# Patient Record
Sex: FEMALE | Race: BLACK OR AFRICAN AMERICAN | NOT HISPANIC OR LATINO | URBAN - METROPOLITAN AREA
[De-identification: names, ages, dates, MRNs, and addresses within clinical notes are randomized per-mention and may not be internally consistent; named-entity substitution may affect disease eponyms.]

---

## 2017-08-25 ENCOUNTER — INPATIENT (INPATIENT)
Facility: HOSPITAL | Age: 62
LOS: 2 days | Discharge: HOME | End: 2017-08-28
Attending: INTERNAL MEDICINE | Admitting: INTERNAL MEDICINE
Payer: COMMERCIAL

## 2017-08-25 VITALS
SYSTOLIC BLOOD PRESSURE: 106 MMHG | HEART RATE: 109 BPM | RESPIRATION RATE: 18 BRPM | DIASTOLIC BLOOD PRESSURE: 52 MMHG | OXYGEN SATURATION: 99 % | TEMPERATURE: 98 F

## 2017-08-25 DIAGNOSIS — R07.9 CHEST PAIN, UNSPECIFIED: ICD-10-CM

## 2017-08-25 LAB
ALBUMIN SERPL ELPH-MCNC: 3.8 G/DL — SIGNIFICANT CHANGE UP (ref 3.3–5)
ALP SERPL-CCNC: 87 U/L — SIGNIFICANT CHANGE UP (ref 40–120)
ALT FLD-CCNC: 16 U/L — SIGNIFICANT CHANGE UP (ref 4–33)
APTT BLD: 31.9 SEC — SIGNIFICANT CHANGE UP (ref 27.5–37.4)
AST SERPL-CCNC: 13 U/L — SIGNIFICANT CHANGE UP (ref 4–32)
BASOPHILS # BLD AUTO: 0.04 K/UL — SIGNIFICANT CHANGE UP (ref 0–0.2)
BASOPHILS NFR BLD AUTO: 0.6 % — SIGNIFICANT CHANGE UP (ref 0–2)
BILIRUB SERPL-MCNC: 0.4 MG/DL — SIGNIFICANT CHANGE UP (ref 0.2–1.2)
BUN SERPL-MCNC: 17 MG/DL — SIGNIFICANT CHANGE UP (ref 7–23)
CALCIUM SERPL-MCNC: 9.2 MG/DL — SIGNIFICANT CHANGE UP (ref 8.4–10.5)
CHLORIDE SERPL-SCNC: 100 MMOL/L — SIGNIFICANT CHANGE UP (ref 98–107)
CK MB BLD-MCNC: 1.89 NG/ML — SIGNIFICANT CHANGE UP (ref 1–4.7)
CK MB BLD-MCNC: SIGNIFICANT CHANGE UP (ref 0–2.5)
CK SERPL-CCNC: 75 U/L — SIGNIFICANT CHANGE UP (ref 25–170)
CO2 SERPL-SCNC: 25 MMOL/L — SIGNIFICANT CHANGE UP (ref 22–31)
CREAT SERPL-MCNC: 1.04 MG/DL — SIGNIFICANT CHANGE UP (ref 0.5–1.3)
D DIMER BLD IA.RAPID-MCNC: < 150 NG/ML — SIGNIFICANT CHANGE UP
EOSINOPHIL # BLD AUTO: 0.19 K/UL — SIGNIFICANT CHANGE UP (ref 0–0.5)
EOSINOPHIL NFR BLD AUTO: 2.7 % — SIGNIFICANT CHANGE UP (ref 0–6)
GLUCOSE SERPL-MCNC: 409 MG/DL — HIGH (ref 70–99)
HCT VFR BLD CALC: 40.4 % — SIGNIFICANT CHANGE UP (ref 34.5–45)
HGB BLD-MCNC: 13.2 G/DL — SIGNIFICANT CHANGE UP (ref 11.5–15.5)
IMM GRANULOCYTES # BLD AUTO: 0.06 # — SIGNIFICANT CHANGE UP
IMM GRANULOCYTES NFR BLD AUTO: 0.8 % — SIGNIFICANT CHANGE UP (ref 0–1.5)
INR BLD: 0.88 — SIGNIFICANT CHANGE UP (ref 0.88–1.17)
LYMPHOCYTES # BLD AUTO: 2.93 K/UL — SIGNIFICANT CHANGE UP (ref 1–3.3)
LYMPHOCYTES # BLD AUTO: 41 % — SIGNIFICANT CHANGE UP (ref 13–44)
MCHC RBC-ENTMCNC: 26.1 PG — LOW (ref 27–34)
MCHC RBC-ENTMCNC: 32.7 % — SIGNIFICANT CHANGE UP (ref 32–36)
MCV RBC AUTO: 79.8 FL — LOW (ref 80–100)
MONOCYTES # BLD AUTO: 0.66 K/UL — SIGNIFICANT CHANGE UP (ref 0–0.9)
MONOCYTES NFR BLD AUTO: 9.2 % — SIGNIFICANT CHANGE UP (ref 2–14)
NEUTROPHILS # BLD AUTO: 3.26 K/UL — SIGNIFICANT CHANGE UP (ref 1.8–7.4)
NEUTROPHILS NFR BLD AUTO: 45.7 % — SIGNIFICANT CHANGE UP (ref 43–77)
NRBC # FLD: 0 — SIGNIFICANT CHANGE UP
NT-PROBNP SERPL-SCNC: 18.65 PG/ML — SIGNIFICANT CHANGE UP
PLATELET # BLD AUTO: 251 K/UL — SIGNIFICANT CHANGE UP (ref 150–400)
PMV BLD: 11.1 FL — SIGNIFICANT CHANGE UP (ref 7–13)
POTASSIUM SERPL-MCNC: 4 MMOL/L — SIGNIFICANT CHANGE UP (ref 3.5–5.3)
POTASSIUM SERPL-SCNC: 4 MMOL/L — SIGNIFICANT CHANGE UP (ref 3.5–5.3)
PROT SERPL-MCNC: 6.6 G/DL — SIGNIFICANT CHANGE UP (ref 6–8.3)
PROTHROM AB SERPL-ACNC: 9.8 SEC — SIGNIFICANT CHANGE UP (ref 9.8–13.1)
RBC # BLD: 5.06 M/UL — SIGNIFICANT CHANGE UP (ref 3.8–5.2)
RBC # FLD: 13.3 % — SIGNIFICANT CHANGE UP (ref 10.3–14.5)
SODIUM SERPL-SCNC: 138 MMOL/L — SIGNIFICANT CHANGE UP (ref 135–145)
TROPONIN T SERPL-MCNC: < 0.06 NG/ML — SIGNIFICANT CHANGE UP (ref 0–0.06)
WBC # BLD: 7.14 K/UL — SIGNIFICANT CHANGE UP (ref 3.8–10.5)
WBC # FLD AUTO: 7.14 K/UL — SIGNIFICANT CHANGE UP (ref 3.8–10.5)

## 2017-08-25 PROCEDURE — 71020: CPT | Mod: 26

## 2017-08-25 RX ORDER — ASPIRIN/CALCIUM CARB/MAGNESIUM 324 MG
162 TABLET ORAL DAILY
Qty: 0 | Refills: 0 | Status: DISCONTINUED | OUTPATIENT
Start: 2017-08-25 | End: 2017-08-26

## 2017-08-25 RX ORDER — SODIUM CHLORIDE 9 MG/ML
1000 INJECTION INTRAMUSCULAR; INTRAVENOUS; SUBCUTANEOUS ONCE
Qty: 0 | Refills: 0 | Status: COMPLETED | OUTPATIENT
Start: 2017-08-25 | End: 2017-08-25

## 2017-08-25 RX ORDER — INSULIN LISPRO 100/ML
6 VIAL (ML) SUBCUTANEOUS ONCE
Qty: 0 | Refills: 0 | Status: COMPLETED | OUTPATIENT
Start: 2017-08-25 | End: 2017-08-25

## 2017-08-25 RX ADMIN — SODIUM CHLORIDE 1000 MILLILITER(S): 9 INJECTION INTRAMUSCULAR; INTRAVENOUS; SUBCUTANEOUS at 19:27

## 2017-08-25 RX ADMIN — Medication 6 UNIT(S): at 19:54

## 2017-08-25 RX ADMIN — Medication 162 MILLIGRAM(S): at 19:27

## 2017-08-25 NOTE — ED ADULT NURSE NOTE - CHIEF COMPLAINT QUOTE
Patient was seen by her MD, and cardiologist for abnormal EKG (PVCs) . Pt is c/o nausea, chest discomfort, palpitations and shortness of breath.

## 2017-08-25 NOTE — ED PROVIDER NOTE - OBJECTIVE STATEMENT
62 yr old female with hx of HTN, DM non-compliant with insulin presents to ed c/o irregular fast and pounding heart beat x1 month worsening past 1 wk.  per pt, states she feels chest stabbing pain at left chest with irregular, fast and pounding beats coming and going at random.  pt also c/o nausea without vomiting and SOB at rest.  pt went to a pcp and then went to a cardiologist at [place] where she works and had stress test done 8/25/17 which showed no ichemic changes or sustained arrythmia.  pt started on metoprolol 25mg which she has been compliant on.  A1c 11.7 2 days ago.  non-smoker but obese

## 2017-08-25 NOTE — ED ADULT NURSE NOTE - ED STAT RN HANDOFF DETAILS
endorsed to natalia jones. pt a*o x3 nad noted. vss. pt offers no complaints at this time. safety maintained

## 2017-08-25 NOTE — ED ADULT NURSE NOTE - OBJECTIVE STATEMENT
Pt to spot # 2 alert and oriented X 3 with family at bedside, H/O DM non-compliant with insulin presents to ed c/o irregular fast and pounding heart beat x1 month worsening past 1 wk.  per pt, states she feels chest stabbing pain at left chest with irregular, fast and pounding beats coming and going at random.  pt also c/o nausea without vomiting and SOB at rest. states she feels like a pulse in her stomach   pt went to a pcp and then went to a cardiologist at [place] where she works and had stress test done 8/25/17 which showed no ischemic changes or sustained arrythmia.  pt started on metoprolol 25mg which she has been compliant on.  Md cortés at bedside, labs drawn and sent vitals as noted, will monitor and follow up

## 2017-08-25 NOTE — ED PROVIDER NOTE - MEDICAL DECISION MAKING DETAILS
62 yr old female with hx of HTN, DM non-compliant with insulin presents to ed c/o irregular fast and pounding heart beat x1 month worsening past 1 wk.  per pt, states she feels chest stabbing pain at left chest with irregular, fast and pounding beats coming and going at random.  pt also c/o nausea without vomiting and SOB at rest.  pt went to a pcp and then went to a cardiologist at [place] where she works and had stress test done 8/25/17 which showed no ichemic changes or sustained arrythmia.  pt started on metoprolol 25mg which she has been compliant on.  A1c 11.7 2 days ago.  non-smoker but obese    palpitations with chest pain r/o acs vs arrhythmia vs PE. labs, cxr, ekg, fs, insulin, fluids, admit

## 2017-08-25 NOTE — ED ADULT TRIAGE NOTE - CHIEF COMPLAINT QUOTE
Patient was seen by her MD, and cardiologist for abnormal EKG (PVCs) . Pt is c/o nausea, chest discomfort, palpitations Patient was seen by her MD, and cardiologist for abnormal EKG (PVCs) . Pt is c/o nausea, chest discomfort, palpitations and shortness of breath.

## 2017-08-25 NOTE — ED PROVIDER NOTE - PROGRESS NOTE DETAILS
[place]: hemodynamically stable.  CXR no acute abnormality.  trop neg and d dimer neg.  Pt ekg no pvc or ischemic changes.  Pt at 9p c/o feel chest pain again.  repeat ekg shows no ischemic changes.  asa given.  Admit to medicine tele for chest pain r/o acs.  took her metoprolol 25mg in ed.

## 2017-08-26 DIAGNOSIS — I10 ESSENTIAL (PRIMARY) HYPERTENSION: ICD-10-CM

## 2017-08-26 DIAGNOSIS — Z90.710 ACQUIRED ABSENCE OF BOTH CERVIX AND UTERUS: Chronic | ICD-10-CM

## 2017-08-26 DIAGNOSIS — E78.5 HYPERLIPIDEMIA, UNSPECIFIED: ICD-10-CM

## 2017-08-26 DIAGNOSIS — E11.65 TYPE 2 DIABETES MELLITUS WITH HYPERGLYCEMIA: ICD-10-CM

## 2017-08-26 DIAGNOSIS — E66.9 OBESITY, UNSPECIFIED: ICD-10-CM

## 2017-08-26 DIAGNOSIS — E11.9 TYPE 2 DIABETES MELLITUS WITHOUT COMPLICATIONS: ICD-10-CM

## 2017-08-26 DIAGNOSIS — R07.9 CHEST PAIN, UNSPECIFIED: ICD-10-CM

## 2017-08-26 DIAGNOSIS — Z29.9 ENCOUNTER FOR PROPHYLACTIC MEASURES, UNSPECIFIED: ICD-10-CM

## 2017-08-26 LAB
CHOLEST SERPL-MCNC: 184 MG/DL — SIGNIFICANT CHANGE UP (ref 120–199)
CK MB BLD-MCNC: 1.67 NG/ML — SIGNIFICANT CHANGE UP (ref 1–4.7)
CK SERPL-CCNC: 66 U/L — SIGNIFICANT CHANGE UP (ref 25–170)
HBA1C BLD-MCNC: 12.5 % — HIGH (ref 4–5.6)
HCT VFR BLD CALC: 40.9 % — SIGNIFICANT CHANGE UP (ref 34.5–45)
HDLC SERPL-MCNC: 46 MG/DL — SIGNIFICANT CHANGE UP (ref 45–65)
HGB BLD-MCNC: 13.1 G/DL — SIGNIFICANT CHANGE UP (ref 11.5–15.5)
LIPID PNL WITH DIRECT LDL SERPL: 122 MG/DL — SIGNIFICANT CHANGE UP
MCHC RBC-ENTMCNC: 26 PG — LOW (ref 27–34)
MCHC RBC-ENTMCNC: 32 % — SIGNIFICANT CHANGE UP (ref 32–36)
MCV RBC AUTO: 81.2 FL — SIGNIFICANT CHANGE UP (ref 80–100)
NRBC # FLD: 0 — SIGNIFICANT CHANGE UP
PLATELET # BLD AUTO: 228 K/UL — SIGNIFICANT CHANGE UP (ref 150–400)
PMV BLD: 10.9 FL — SIGNIFICANT CHANGE UP (ref 7–13)
RBC # BLD: 5.04 M/UL — SIGNIFICANT CHANGE UP (ref 3.8–5.2)
RBC # FLD: 13.3 % — SIGNIFICANT CHANGE UP (ref 10.3–14.5)
TRIGL SERPL-MCNC: 123 MG/DL — SIGNIFICANT CHANGE UP (ref 10–149)
TROPONIN T SERPL-MCNC: < 0.06 NG/ML — SIGNIFICANT CHANGE UP (ref 0–0.06)
TSH SERPL-MCNC: 1.51 UIU/ML — SIGNIFICANT CHANGE UP (ref 0.27–4.2)
WBC # BLD: 5.39 K/UL — SIGNIFICANT CHANGE UP (ref 3.8–10.5)
WBC # FLD AUTO: 5.39 K/UL — SIGNIFICANT CHANGE UP (ref 3.8–10.5)

## 2017-08-26 PROCEDURE — 99254 IP/OBS CNSLTJ NEW/EST MOD 60: CPT

## 2017-08-26 RX ORDER — LOSARTAN POTASSIUM 100 MG/1
100 TABLET, FILM COATED ORAL DAILY
Qty: 0 | Refills: 0 | Status: DISCONTINUED | OUTPATIENT
Start: 2017-08-26 | End: 2017-08-28

## 2017-08-26 RX ORDER — METOPROLOL TARTRATE 50 MG
25 TABLET ORAL
Qty: 0 | Refills: 0 | Status: DISCONTINUED | OUTPATIENT
Start: 2017-08-26 | End: 2017-08-28

## 2017-08-26 RX ORDER — INSULIN LISPRO 100/ML
VIAL (ML) SUBCUTANEOUS AT BEDTIME
Qty: 0 | Refills: 0 | Status: DISCONTINUED | OUTPATIENT
Start: 2017-08-26 | End: 2017-08-28

## 2017-08-26 RX ORDER — GLUCAGON INJECTION, SOLUTION 0.5 MG/.1ML
1 INJECTION, SOLUTION SUBCUTANEOUS ONCE
Qty: 0 | Refills: 0 | Status: DISCONTINUED | OUTPATIENT
Start: 2017-08-26 | End: 2017-08-28

## 2017-08-26 RX ORDER — HEPARIN SODIUM 5000 [USP'U]/ML
5000 INJECTION INTRAVENOUS; SUBCUTANEOUS EVERY 12 HOURS
Qty: 0 | Refills: 0 | Status: DISCONTINUED | OUTPATIENT
Start: 2017-08-26 | End: 2017-08-28

## 2017-08-26 RX ORDER — INSULIN GLARGINE 100 [IU]/ML
30 INJECTION, SOLUTION SUBCUTANEOUS AT BEDTIME
Qty: 0 | Refills: 0 | Status: DISCONTINUED | OUTPATIENT
Start: 2017-08-26 | End: 2017-08-28

## 2017-08-26 RX ORDER — HYDROCHLOROTHIAZIDE 25 MG
12.5 TABLET ORAL DAILY
Qty: 0 | Refills: 0 | Status: DISCONTINUED | OUTPATIENT
Start: 2017-08-26 | End: 2017-08-28

## 2017-08-26 RX ORDER — ASPIRIN/CALCIUM CARB/MAGNESIUM 324 MG
81 TABLET ORAL DAILY
Qty: 0 | Refills: 0 | Status: DISCONTINUED | OUTPATIENT
Start: 2017-08-26 | End: 2017-08-27

## 2017-08-26 RX ORDER — DEXTROSE 50 % IN WATER 50 %
1 SYRINGE (ML) INTRAVENOUS ONCE
Qty: 0 | Refills: 0 | Status: DISCONTINUED | OUTPATIENT
Start: 2017-08-26 | End: 2017-08-28

## 2017-08-26 RX ORDER — DEXTROSE 50 % IN WATER 50 %
12.5 SYRINGE (ML) INTRAVENOUS ONCE
Qty: 0 | Refills: 0 | Status: DISCONTINUED | OUTPATIENT
Start: 2017-08-26 | End: 2017-08-28

## 2017-08-26 RX ORDER — INSULIN GLARGINE 100 [IU]/ML
24 INJECTION, SOLUTION SUBCUTANEOUS AT BEDTIME
Qty: 0 | Refills: 0 | Status: DISCONTINUED | OUTPATIENT
Start: 2017-08-26 | End: 2017-08-26

## 2017-08-26 RX ORDER — DEXTROSE 50 % IN WATER 50 %
25 SYRINGE (ML) INTRAVENOUS ONCE
Qty: 0 | Refills: 0 | Status: DISCONTINUED | OUTPATIENT
Start: 2017-08-26 | End: 2017-08-28

## 2017-08-26 RX ORDER — INSULIN LISPRO 100/ML
10 VIAL (ML) SUBCUTANEOUS
Qty: 0 | Refills: 0 | Status: DISCONTINUED | OUTPATIENT
Start: 2017-08-26 | End: 2017-08-28

## 2017-08-26 RX ORDER — INSULIN LISPRO 100/ML
VIAL (ML) SUBCUTANEOUS
Qty: 0 | Refills: 0 | Status: DISCONTINUED | OUTPATIENT
Start: 2017-08-26 | End: 2017-08-28

## 2017-08-26 RX ORDER — INSULIN LISPRO 100/ML
8 VIAL (ML) SUBCUTANEOUS
Qty: 0 | Refills: 0 | Status: DISCONTINUED | OUTPATIENT
Start: 2017-08-26 | End: 2017-08-26

## 2017-08-26 RX ORDER — SODIUM CHLORIDE 9 MG/ML
1000 INJECTION, SOLUTION INTRAVENOUS
Qty: 0 | Refills: 0 | Status: DISCONTINUED | OUTPATIENT
Start: 2017-08-26 | End: 2017-08-28

## 2017-08-26 RX ADMIN — Medication 4: at 12:46

## 2017-08-26 RX ADMIN — Medication 8: at 08:53

## 2017-08-26 RX ADMIN — Medication 25 MILLIGRAM(S): at 06:31

## 2017-08-26 RX ADMIN — LOSARTAN POTASSIUM 100 MILLIGRAM(S): 100 TABLET, FILM COATED ORAL at 06:54

## 2017-08-26 RX ADMIN — INSULIN GLARGINE 30 UNIT(S): 100 INJECTION, SOLUTION SUBCUTANEOUS at 21:58

## 2017-08-26 RX ADMIN — Medication 81 MILLIGRAM(S): at 12:47

## 2017-08-26 RX ADMIN — Medication 10 UNIT(S): at 17:29

## 2017-08-26 RX ADMIN — Medication 25 MILLIGRAM(S): at 17:39

## 2017-08-26 RX ADMIN — HEPARIN SODIUM 5000 UNIT(S): 5000 INJECTION INTRAVENOUS; SUBCUTANEOUS at 06:32

## 2017-08-26 RX ADMIN — Medication 2: at 17:28

## 2017-08-26 RX ADMIN — Medication 12.5 MILLIGRAM(S): at 06:31

## 2017-08-26 RX ADMIN — HEPARIN SODIUM 5000 UNIT(S): 5000 INJECTION INTRAVENOUS; SUBCUTANEOUS at 17:39

## 2017-08-26 NOTE — H&P ADULT - ATTENDING COMMENTS
62 year old woman with HTN and uncontrolled IDDM II presents with chest pain    #Chest pain- patient with plain exercise stress test in outside cardiologist's office yesterday.  Results reviewed- no ischemic changes on EKG, but patient noted to have ventricular bigeminy.  No ischemic changes on admission EKG.  Given high pre-test probability for CAD given uncontrolled DM and HTN, will further assess for ischemia with nuclear stress test.  Continue ASA and beta-blocker    #Uncontrolled IDDM II- basal/bolus insulin. Endocrine consult.    #HTN- BP at goal on current regimen    #Obesity- patient counselled on diet and exercise.    Plan of care discussed at length with patient. 62 year old woman with HTN and uncontrolled IDDM II presents with chest pain    #Chest pain- patient with plain exercise stress test in outside cardiologist's office yesterday.  Results reviewed- no ischemic changes on EKG, but patient noted to have ventricular bigeminy.  No ischemic changes on admission EKG.  Given high pre-test probability for CAD given uncontrolled DM and HTN, will further assess for ischemia with nuclear stress test.  Continue ASA and beta-blocker  Check TTE    #Uncontrolled IDDM II- basal/bolus insulin. Endocrine consult.    #HTN- BP at goal on current regimen    #Obesity- patient counselled on diet and exercise.    Plan of care discussed at length with patient. 62 year old woman with HTN and uncontrolled IDDM II presents with chest pain    #Chest pain- patient with plain exercise stress test in outside cardiologist's office yesterday.  Results reviewed- no ischemic changes on EKG, but patient noted to have ventricular bigeminy.  No ischemic changes on admission EKG.  Given high pre-test probability for CAD given uncontrolled DM and HTN, will further assess for ischemia with nuclear stress test.  Continue ASA and beta-blocker  Check TTE    #Hyperglycemic non-ketotic state- Uncontrolled IDDM II- basal/bolus insulin. No anion gap.  Endocrine consult.    #HTN- BP at goal on current regimen    #Obesity- patient counselled on diet and exercise.    Plan of care discussed at length with patient. 62 year old woman with HTN and uncontrolled IDDM II presents with chest pain    #Chest pain- patient with plain exercise stress test in outside cardiologist's office yesterday.  Results reviewed- no ischemic changes on EKG, but patient noted to have ventricular bigeminy.  No ischemic changes on admission EKG.  Given high pre-test probability for CAD given uncontrolled DM and HTN, will further assess for ischemia with nuclear stress test.  Continue ASA and beta-blocker  Check TTE    #Hyperglycemic non-ketotic state- Uncontrolled IDDM II- basal/bolus insulin. No anion gap.  Endocrine consult.    #HTN- BP at goal on current regimen    #Obesity- patient counselled on diet and exercise.    Plan of care discussed at length with patient.    Ja Gonzalez M.D.  (For Dr. Guajardo)  Cardiovascular Disease

## 2017-08-26 NOTE — H&P ADULT - NEGATIVE NEUROLOGICAL SYMPTOMS
no headache/no weakness/no confusion/no focal seizures/no transient paralysis/no loss of consciousness/no hemiparesis/no generalized seizures/no vertigo/no syncope/no paresthesias/no loss of sensation/no difficulty walking/no tremors

## 2017-08-26 NOTE — H&P ADULT - RS GEN PE MLT RESP DETAILS PC
airway patent/clear to auscultation bilaterally/no rhonchi/normal/no chest wall tenderness/no rales/respirations non-labored/breath sounds equal/no wheezes/good air movement/no intercostal retractions

## 2017-08-26 NOTE — H&P ADULT - NSHPSOCIALHISTORY_GEN_ALL_CORE
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

## 2017-08-26 NOTE — H&P ADULT - NEGATIVE GASTROINTESTINAL SYMPTOMS
no hematochezia/no change in bowel habits/no diarrhea/no nausea/no constipation/no abdominal pain/no vomiting/no melena

## 2017-08-26 NOTE — H&P ADULT - PROBLEM SELECTOR PLAN 1
Will continue to monitor on telemetry, serial EKG and CE prn for episodes of chest pain  HgbA1C, TSH, lipid profile, CBC, CMP in am   Outpatient stress test results in chart   Continue with Aspirin 81mg daily

## 2017-08-26 NOTE — H&P ADULT - PMH
HTN (hypertension)    Obesity    Type 2 diabetes mellitus without complication, with long-term current use of insulin

## 2017-08-26 NOTE — H&P ADULT - GASTROINTESTINAL DETAILS
no rebound tenderness/soft/no guarding/nontender/normal/bowel sounds normal/no rigidity/no distention

## 2017-08-26 NOTE — CONSULT NOTE ADULT - SUBJECTIVE AND OBJECTIVE BOX
HPI:  61 yo female with uncontrolled type 2 diabetes (HbA1c 12.5%) complicated by neuropathy, morbid obesity presents with palpitations and chest pain. Patient underwent a stress test.    Patient was diagnosed with type 2 diabetes 7 years ago. She previously tried Januvia and Metformin and stated that she was "sick" on the medications. She did not remember having any diarrhea on the Metformin. Then she was switched to Humalog 75/25 mix, she is supposed to take 25u qAM and 30u qPM however patient states that she gets palpitations taking insulin so she stopped taking the insulin approximately 2 months ago. She checks her FS in the AM, mostly in the 300s. Denies any hypoglycemia episodes or symptoms.    Complications- last optho visit was 6 months ago, no known retinopathy, + neuropathy, no known proteinuria. Diet- AM- doesn't eat, lunch- [place] food, rice, beans, fast food like [employer], [employer], Chinese foods. No soda. Drinks cranberry juice and OJ. Does not exercise at home.    Her mother has T2DM. 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      PAST MEDICAL & SURGICAL HISTORY:  Obesity  Type 2 diabetes mellitus without complication, with long-term current use of insulin  HTN (hypertension)  H/O: hysterectomy      FAMILY HISTORY:  Mother- T2DM    Social History: no smoking or ETOH, denies recreational drug use    Outpatient Medications:   	losartan-hydrochlorothiazide 100mg-12.5mg oral tablet: 1 tab(s) orally once a day, Last Dose Taken:    · 	metoprolol tartrate 25 mg oral tablet: 1 tab(s) orally 2 times a day, Last Dose Taken:    · 	Aspirin Enteric Coated 81 mg oral delayed release tablet: 1 tab(s) orally once a day, Last Dose Taken:    · 	HumaLOG Mix 75/25 subcutaneous suspension: 25 unit(s) subcutaneous once a day, Last Dose Taken:    · 	HumaLOG Mix 75/25 subcutaneous suspension: 30 unit(s) subcutaneous once a day, Last Dose Taken:      MEDICATIONS  (STANDING):  aspirin enteric coated 81 milliGRAM(s) Oral daily  metoprolol 25 milliGRAM(s) Oral two times a day  hydrochlorothiazide 12.5 milliGRAM(s) Oral daily  losartan 100 milliGRAM(s) Oral daily  heparin  Injectable 5000 Unit(s) SubCutaneous every 12 hours  insulin lispro (HumaLOG) corrective regimen sliding scale   SubCutaneous three times a day before meals  insulin lispro (HumaLOG) corrective regimen sliding scale   SubCutaneous at bedtime  dextrose 5%. 1000 milliLiter(s) (50 mL/Hr) IV Continuous <Continuous>  dextrose 50% Injectable 12.5 Gram(s) IV Push once  dextrose 50% Injectable 25 Gram(s) IV Push once  dextrose 50% Injectable 25 Gram(s) IV Push once  insulin glargine Injectable (LANTUS) 24 Unit(s) SubCutaneous at bedtime  insulin lispro Injectable (HumaLOG) 8 Unit(s) SubCutaneous three times a day before meals    MEDICATIONS  (PRN):  dextrose Gel 1 Dose(s) Oral once PRN Blood Glucose LESS THAN 70 milliGRAM(s)/deciliter  glucagon  Injectable 1 milliGRAM(s) IntraMuscular once PRN Glucose LESS THAN 70 milligrams/deciliter      Allergies    Metamucil (Short breath)  morphine (Pruritus)    Intolerances      Review of Systems:  Constitutional: No fever  Eyes: No blurry vision  Neuro: No tremors  HEENT: No pain  Cardiovascular: + chest pain,+ palpitations  Respiratory: No SOB, no cough  GI: No nausea, vomiting, abdominal pain  : No dysuria  Skin: no rash  Psych: no depression  Endocrine: no polyuria, polydipsia  Hem/lymph: no swelling  Osteoporosis: no fractures    ALL OTHER SYSTEMS REVIEWED AND NEGATIVE    PHYSICAL EXAM:  VITALS: T(C): 36.6 (08-26-17 @ 05:50)  T(F): 97.8 (08-26-17 @ 05:50), Max: 98.1 (08-25-17 @ 19:33)  HR: 56 (08-26-17 @ 10:27) (56 - 109)  BP: 133/81 (08-26-17 @ 10:27) (106/52 - 142/68)  RR:  (15 - 20)  SpO2:  (96% - 100%)  Wt(kg): --  GENERAL: NAD, well-groomed, well-developed, obese  EYES: No proptosis, no lid lag, anicteric  HEENT:  Atraumatic, Normocephalic, moist mucous membranes  THYROID: Normal size, no palpable nodules  RESPIRATORY: Clear to auscultation bilaterally; No rales, rhonchi, wheezing, or rubs  CARDIOVASCULAR: Regular rate and rhythm; No murmurs; no peripheral edema  GI: Soft, nontender, non distended, normal bowel sounds  SKIN: Dry, intact, No rashes or lesions  MUSCULOSKELETAL: Full range of motion, normal strength  NEURO: sensation intact, extraocular movements intact, no tremor, normal reflexes  PSYCH: Alert and oriented x 3, normal affect, normal mood    CAPILLARY BLOOD GLUCOSE  238 (08-26 @ 12:30)  342 (08-26 @ 08:39)  350 (08-25 @ 19:17)  351 (08-25 @ 18:33)                            13.1   5.39  )-----------( 228      ( 26 Aug 2017 06:30 )             40.9       08-25    138  |  100  |  17  ----------------------------<  409<H>  4.0   |  25  |  1.04    EGFR if : 66  EGFR if non : 57    Ca    9.2      08-25    TPro  6.6  /  Alb  3.8  /  TBili  0.4  /  DBili  x   /  AST  13  /  ALT  16  /  AlkPhos  87  08-25      Thyroid Function Tests:  08-26 @ 06:30 TSH 1.51 FreeT4 -- T3 -- Anti TPO -- Anti Thyroglobulin Ab -- TSI --      Hemoglobin A1C, Whole Blood: 12.5 % <H> [4.0 - 5.6] (08-26-17 @ 06:30)      08-26 Chol 184  HDL 46 Trig 123 HPI:  61 yo female with uncontrolled type 2 diabetes (HbA1c 12.5%) complicated by neuropathy, morbid obesity presents with palpitations and chest pain. Patient underwent a stress test.    Patient was diagnosed with type 2 diabetes 7 years ago. She previously tried Januvia and Metformin and stated that she was "sick" on the medications. She did not remember having any diarrhea on the Metformin. Then she was switched to Humalog 75/25 mix, she is supposed to take 25u qAM and 30u qPM however patient states that she gets palpitations taking insulin so she stopped taking the insulin approximately 2 months ago. She checks her FS in the AM, mostly in the 300s. Denies any hypoglycemia episodes or symptoms.    Complications- last optho visit was 6 months ago, no known retinopathy, + neuropathy, no known proteinuria. Diet- AM- doesn't eat, lunch- [place] food, rice, beans, fast food like [employer], [employer], Chinese foods. No soda. Drinks cranberry juice and OJ. Does not exercise at home.    Her mother has T2DM. 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      PAST MEDICAL & SURGICAL HISTORY:  Obesity  Type 2 diabetes mellitus without complication, with long-term current use of insulin  HTN (hypertension)  H/O: hysterectomy      FAMILY HISTORY:  Mother- T2DM    Social History: no smoking or ETOH, denies recreational drug use    Outpatient Medications:   	losartan-hydrochlorothiazide 100mg-12.5mg oral tablet: 1 tab(s) orally once a day, Last Dose Taken:    · 	metoprolol tartrate 25 mg oral tablet: 1 tab(s) orally 2 times a day, Last Dose Taken:    · 	Aspirin Enteric Coated 81 mg oral delayed release tablet: 1 tab(s) orally once a day, Last Dose Taken:    · 	HumaLOG Mix 75/25 subcutaneous suspension: 25 unit(s) subcutaneous once a day, Last Dose Taken:    · 	HumaLOG Mix 75/25 subcutaneous suspension: 30 unit(s) subcutaneous once a day, Last Dose Taken:      MEDICATIONS  (STANDING):  aspirin enteric coated 81 milliGRAM(s) Oral daily  metoprolol 25 milliGRAM(s) Oral two times a day  hydrochlorothiazide 12.5 milliGRAM(s) Oral daily  losartan 100 milliGRAM(s) Oral daily  heparin  Injectable 5000 Unit(s) SubCutaneous every 12 hours  insulin lispro (HumaLOG) corrective regimen sliding scale   SubCutaneous three times a day before meals  insulin lispro (HumaLOG) corrective regimen sliding scale   SubCutaneous at bedtime  dextrose 5%. 1000 milliLiter(s) (50 mL/Hr) IV Continuous <Continuous>  dextrose 50% Injectable 12.5 Gram(s) IV Push once  dextrose 50% Injectable 25 Gram(s) IV Push once  dextrose 50% Injectable 25 Gram(s) IV Push once  insulin glargine Injectable (LANTUS) 24 Unit(s) SubCutaneous at bedtime  insulin lispro Injectable (HumaLOG) 8 Unit(s) SubCutaneous three times a day before meals    MEDICATIONS  (PRN):  dextrose Gel 1 Dose(s) Oral once PRN Blood Glucose LESS THAN 70 milliGRAM(s)/deciliter  glucagon  Injectable 1 milliGRAM(s) IntraMuscular once PRN Glucose LESS THAN 70 milligrams/deciliter      Allergies    Metamucil (Short breath)  morphine (Pruritus)    Review of Systems:  Constitutional: No fever  Eyes: No blurry vision  Neuro: No tremors  HEENT: No pain  Cardiovascular: + chest pain,+ palpitations  Respiratory: No SOB, no cough  GI: No nausea, vomiting, abdominal pain  : No dysuria  Skin: no rash  Psych: no depression  Endocrine: no polyuria, polydipsia    ALL OTHER SYSTEMS REVIEWED AND NEGATIVE    PHYSICAL EXAM:  VITALS: T(C): 36.6 (08-26-17 @ 05:50)  T(F): 97.8 (08-26-17 @ 05:50), Max: 98.1 (08-25-17 @ 19:33)  HR: 56 (08-26-17 @ 10:27) (56 - 109)  BP: 133/81 (08-26-17 @ 10:27) (106/52 - 142/68)  RR:  (15 - 20)  SpO2:  (96% - 100%)  Wt(kg): --  GENERAL: NAD, well-groomed, well-developed, obese  EYES: No proptosis, no lid lag, anicteric  HEENT:  Atraumatic, Normocephalic, moist mucous membranes  THYROID: Normal size, no palpable nodules  RESPIRATORY: Clear to auscultation bilaterally; No rales, rhonchi, wheezing, or rubs  CARDIOVASCULAR: Regular rate and rhythm; No murmurs; no peripheral edema  GI: Soft, nontender, non distended, normal bowel sounds  SKIN: Dry, intact, No rashes or lesions  MUSCULOSKELETAL: Full range of motion, normal strength  NEURO: sensation intact, extraocular movements intact, no tremor, normal reflexes  PSYCH: Alert and oriented x 3, normal affect, normal mood    CAPILLARY BLOOD GLUCOSE  238 (08-26 @ 12:30)  342 (08-26 @ 08:39)  350 (08-25 @ 19:17)  351 (08-25 @ 18:33)                            13.1   5.39  )-----------( 228      ( 26 Aug 2017 06:30 )             40.9       08-25    138  |  100  |  17  ----------------------------<  409<H>  4.0   |  25  |  1.04    EGFR if : 66  EGFR if non : 57    Ca    9.2      08-25    TPro  6.6  /  Alb  3.8  /  TBili  0.4  /  DBili  x   /  AST  13  /  ALT  16  /  AlkPhos  87  08-25      Thyroid Function Tests:  08-26 @ 06:30 TSH 1.51 FreeT4 -- T3 -- Anti TPO -- Anti Thyroglobulin Ab -- TSI --      Hemoglobin A1C, Whole Blood: 12.5 % <H> [4.0 - 5.6] (08-26-17 @ 06:30)      08-26 Chol 184  HDL 46 Trig 123

## 2017-08-26 NOTE — H&P ADULT - PROBLEM SELECTOR PLAN 4
Encourage weight loss via diet and exercise  Consult with pt about healthy eating habits and various exercises

## 2017-08-26 NOTE — CONSULT NOTE ADULT - PROBLEM SELECTOR RECOMMENDATION 9
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 Consider GLP-1 agonist as outpatient to aid with weight loss

## 2017-08-26 NOTE — H&P ADULT - NEGATIVE MUSCULOSKELETAL SYMPTOMS
no stiffness/no arthritis/no myalgia/no joint swelling/no neck pain/no muscle weakness/no arthralgia/no muscle cramps

## 2017-08-26 NOTE — H&P ADULT - NEGATIVE ENMT SYMPTOMS
no nasal congestion/no hearing difficulty/no tinnitus/no sinus symptoms/no vertigo/no ear pain/no nasal discharge

## 2017-08-26 NOTE — H&P ADULT - HISTORY OF PRESENT ILLNESS
63 y/o F with PMH of HTN, DM(non compliant with insulin), Obesity presented with the complaint of palpitations and chest pain. As per the patient she has been having intermittent left sided chest pain, characterized as an electric type of pain, without any aggravating factors, with mild alleviation when she takes Aspirin, without any radiation of the pain, with a severity of 10/10. Patient stated that with the chest pain she would also get SOB and palpitations for which she went to see her PMD and was told to see a cardiologist. Patient had a stress test and TTE performed at the cardiologist office yesterday and was "negative ETT for ischemia. No sustained arrhythmia". After the stress test and when patient was going home she developed similar chest pain, and was perspiring and then decided to come to the ER. Patient stated that when she takes her insulin she would get palpitations and sweating excessively for that reason she has not taken her Insulin for the past 2 months. Patient denied any fevers, chills, N/V/D/C, headaches, abdominal pain, cough, melena, hematochezia, dysuria, recent travel, sick contact, pleuritic or positional chest pain.     On ED admission EKG revealed  NSR at a rate of 65 with TWI in lead III, Qtc of 440, CE x 1: Negative, D-dimer: Negative, Gluc: 409, BNP: 18.65. Prelim CXR: no emergent findings. Patient received 1x dose of Aspirin in the ED. When examined patient is resting in the stretcher and denied any current complaints.

## 2017-08-26 NOTE — H&P ADULT - ASSESSMENT
63 y/o F with PMH of HTN, DM(non compliant with insulin), Obesity presented with the complaint of palpitations and chest pain. R/o ACS

## 2017-08-26 NOTE — H&P ADULT - PROBLEM SELECTOR PLAN 3
On moderate dose insulin sliding scale(humalog)  FS TID and at bedtime, HgbA1C in am  Will need endocrine consult in am for non compliance for insulin and Glucose over 400

## 2017-08-27 ENCOUNTER — TRANSCRIPTION ENCOUNTER (OUTPATIENT)
Age: 62
End: 2017-08-27

## 2017-08-27 DIAGNOSIS — Z91.19 PATIENT'S NONCOMPLIANCE WITH OTHER MEDICAL TREATMENT AND REGIMEN: ICD-10-CM

## 2017-08-27 LAB
BASOPHILS # BLD AUTO: 0.02 K/UL — SIGNIFICANT CHANGE UP (ref 0–0.2)
BASOPHILS NFR BLD AUTO: 0.4 % — SIGNIFICANT CHANGE UP (ref 0–2)
BUN SERPL-MCNC: 16 MG/DL — SIGNIFICANT CHANGE UP (ref 7–23)
CALCIUM SERPL-MCNC: 9.3 MG/DL — SIGNIFICANT CHANGE UP (ref 8.4–10.5)
CHLORIDE SERPL-SCNC: 101 MMOL/L — SIGNIFICANT CHANGE UP (ref 98–107)
CO2 SERPL-SCNC: 24 MMOL/L — SIGNIFICANT CHANGE UP (ref 22–31)
CREAT SERPL-MCNC: 0.83 MG/DL — SIGNIFICANT CHANGE UP (ref 0.5–1.3)
EOSINOPHIL # BLD AUTO: 0.14 K/UL — SIGNIFICANT CHANGE UP (ref 0–0.5)
EOSINOPHIL NFR BLD AUTO: 2.8 % — SIGNIFICANT CHANGE UP (ref 0–6)
GLUCOSE SERPL-MCNC: 295 MG/DL — HIGH (ref 70–99)
HCT VFR BLD CALC: 42.2 % — SIGNIFICANT CHANGE UP (ref 34.5–45)
HGB BLD-MCNC: 13.4 G/DL — SIGNIFICANT CHANGE UP (ref 11.5–15.5)
IMM GRANULOCYTES # BLD AUTO: 0.05 # — SIGNIFICANT CHANGE UP
IMM GRANULOCYTES NFR BLD AUTO: 1 % — SIGNIFICANT CHANGE UP (ref 0–1.5)
LYMPHOCYTES # BLD AUTO: 2.16 K/UL — SIGNIFICANT CHANGE UP (ref 1–3.3)
LYMPHOCYTES # BLD AUTO: 43.5 % — SIGNIFICANT CHANGE UP (ref 13–44)
MAGNESIUM SERPL-MCNC: 1.8 MG/DL — SIGNIFICANT CHANGE UP (ref 1.6–2.6)
MCHC RBC-ENTMCNC: 25.8 PG — LOW (ref 27–34)
MCHC RBC-ENTMCNC: 31.8 % — LOW (ref 32–36)
MCV RBC AUTO: 81.2 FL — SIGNIFICANT CHANGE UP (ref 80–100)
MONOCYTES # BLD AUTO: 0.51 K/UL — SIGNIFICANT CHANGE UP (ref 0–0.9)
MONOCYTES NFR BLD AUTO: 10.3 % — SIGNIFICANT CHANGE UP (ref 2–14)
NEUTROPHILS # BLD AUTO: 2.09 K/UL — SIGNIFICANT CHANGE UP (ref 1.8–7.4)
NEUTROPHILS NFR BLD AUTO: 42 % — LOW (ref 43–77)
NRBC # FLD: 0 — SIGNIFICANT CHANGE UP
PLATELET # BLD AUTO: 214 K/UL — SIGNIFICANT CHANGE UP (ref 150–400)
PMV BLD: 10.7 FL — SIGNIFICANT CHANGE UP (ref 7–13)
POTASSIUM SERPL-MCNC: 4.3 MMOL/L — SIGNIFICANT CHANGE UP (ref 3.5–5.3)
POTASSIUM SERPL-SCNC: 4.3 MMOL/L — SIGNIFICANT CHANGE UP (ref 3.5–5.3)
RBC # BLD: 5.2 M/UL — SIGNIFICANT CHANGE UP (ref 3.8–5.2)
RBC # FLD: 13.3 % — SIGNIFICANT CHANGE UP (ref 10.3–14.5)
SODIUM SERPL-SCNC: 137 MMOL/L — SIGNIFICANT CHANGE UP (ref 135–145)
WBC # BLD: 4.97 K/UL — SIGNIFICANT CHANGE UP (ref 3.8–10.5)
WBC # FLD AUTO: 4.97 K/UL — SIGNIFICANT CHANGE UP (ref 3.8–10.5)

## 2017-08-27 PROCEDURE — 93016 CV STRESS TEST SUPVJ ONLY: CPT | Mod: GC

## 2017-08-27 PROCEDURE — 78452 HT MUSCLE IMAGE SPECT MULT: CPT | Mod: 26

## 2017-08-27 PROCEDURE — 93018 CV STRESS TEST I&R ONLY: CPT | Mod: GC

## 2017-08-27 RX ORDER — ASPIRIN/CALCIUM CARB/MAGNESIUM 324 MG
81 TABLET ORAL DAILY
Qty: 0 | Refills: 0 | Status: DISCONTINUED | OUTPATIENT
Start: 2017-08-27 | End: 2017-08-28

## 2017-08-27 RX ADMIN — Medication 4: at 14:24

## 2017-08-27 RX ADMIN — Medication 8: at 17:56

## 2017-08-27 RX ADMIN — HEPARIN SODIUM 5000 UNIT(S): 5000 INJECTION INTRAVENOUS; SUBCUTANEOUS at 17:53

## 2017-08-27 RX ADMIN — Medication 10 UNIT(S): at 17:56

## 2017-08-27 RX ADMIN — HEPARIN SODIUM 5000 UNIT(S): 5000 INJECTION INTRAVENOUS; SUBCUTANEOUS at 05:04

## 2017-08-27 RX ADMIN — Medication 25 MILLIGRAM(S): at 17:53

## 2017-08-27 RX ADMIN — INSULIN GLARGINE 30 UNIT(S): 100 INJECTION, SOLUTION SUBCUTANEOUS at 21:36

## 2017-08-27 RX ADMIN — Medication 10 UNIT(S): at 14:25

## 2017-08-27 RX ADMIN — Medication 81 MILLIGRAM(S): at 17:54

## 2017-08-27 RX ADMIN — Medication 6: at 09:02

## 2017-08-27 RX ADMIN — Medication 10 UNIT(S): at 09:02

## 2017-08-27 NOTE — CONSULT NOTE ADULT - PROBLEM SELECTOR RECOMMENDATION 5
long d/w patient children at bedside  patient promises to stay compliant with meds and follow up with MD

## 2017-08-27 NOTE — DISCHARGE NOTE ADULT - CARE PROVIDER_API CALL
Ja Gonzalez), Internal Medicine  [address] [place] [address]  Phone: [phone]  Fax: [phone] Derick Guajardo), Cardiovascular Disease; Internal Medicine; Interventional Cardiology; Nuclear Cardiology  [address] [facility] [place] [address]  [place] [address]  Phone: [phone]  Fax: [phone]    Dr. Piedra-PCP,   Phone: (   )    -  Fax: (   )    - Derick Guajardo), Cardiovascular Disease; Internal Medicine; Interventional Cardiology; Nuclear Cardiology  [address] [facility] [address]  [place] [address]  Phone: [phone]  Fax: [phone]    Dr. Piedra-PCP,   Phone: (   )    -  Fax: (   )    -    Endocrine Clinic,   [address] [place] [address]  Phone: [phone]  Fax: (   )    -

## 2017-08-27 NOTE — DISCHARGE NOTE ADULT - ADDITIONAL INSTRUCTIONS
Follow up with Endocrine Clinic [address] [place] [address] [phone], call and make an appointment   Follow up with your Cardiologist Dr. Guajardo in 1-2 weeks, call and make an appointment Follow up with Endocrine Clinic [address] [place] [address] [phone], call and make an appointment   Follow up with your Cardiologist Dr. Guajardo on 9/14/ 17 at 1:30 pm

## 2017-08-27 NOTE — DISCHARGE NOTE ADULT - PATIENT PORTAL LINK FT
“You can access the FollowHealth Patient Portal, offered by [facility], by registering with the following website: [email or web address]”

## 2017-08-27 NOTE — DISCHARGE NOTE ADULT - CARE PLAN
Principal Discharge DX:	Chest pain  Instructions for follow-up, activity and diet:	Follow up with Your cardiologist in 1-2 weeks  Secondary Diagnosis:	HTN (hypertension)  Goal:	Low Salt Diet  Instructions for follow-up, activity and diet:	Follow up with Your PMD or Cardiologist in 1 week  Secondary Diagnosis:	Uncontrolled diabetes mellitus  Goal:	Diet and Meds compliance  Instructions for follow-up, activity and diet:	Follow up with Endocrine Clinic   [address] [place] [address]  [phone] Principal Discharge DX:	Chest pain  Goal:	prevent reoccurrence of pain  Instructions for follow-up, activity and diet:	Follow up with your Cardiologist Dr. Guajardo in 1-2 weeks, cardiac workup normal in hospital  Secondary Diagnosis:	HTN (hypertension)  Goal:	Low Salt Diet  Instructions for follow-up, activity and diet:	Follow up with Your PMD or Cardiologist in 1-2 weeks, continue your antihypertensive meds  Secondary Diagnosis:	Uncontrolled diabetes mellitus  Goal:	Diet and Meds compliance  Instructions for follow-up, activity and diet:	Follow up with Endocrine Clinic [address] [place] [address] [phone], consistent carbohydrate diet, continue insulin regimen as prescribed, keep log of your fingersticks to show Endocrinologist Principal Discharge DX:	Chest pain  Goal:	prevent reoccurrence of pain  Instructions for follow-up, activity and diet:	Follow up with your Cardiologist Dr. Guajardo on 9/14/ 17 at 1:30 pm, stress test was normal study, outpatient echocardiogram  Secondary Diagnosis:	HTN (hypertension)  Goal:	Low Salt Diet  Instructions for follow-up, activity and diet:	Follow up with Your PMD or Cardiologist in 1-2 weeks, continue your antihypertensive meds  Secondary Diagnosis:	Uncontrolled diabetes mellitus  Goal:	Diet and Meds compliance  Instructions for follow-up, activity and diet:	Follow up with Endocrine Clinic [address] [place] [address] [phone], consistent carbohydrate diet, continue insulin regimen as prescribed, keep log of your fingersticks to show Endocrinologist Principal Discharge DX:	Chest pain  Goal:	prevent reoccurrence of pain  Instructions for follow-up, activity and diet:	Follow up with your Cardiologist Dr. Guajardo on 9/14/ 17 at 1:30 pm, stress test was normal study, outpatient echocardiogram  Secondary Diagnosis:	HTN (hypertension)  Goal:	Low Salt Diet  Instructions for follow-up, activity and diet:	Follow up with Your PMD or Cardiologist in 1-2 weeks, continue your antihypertensive meds  Secondary Diagnosis:	Uncontrolled diabetes mellitus  Goal:	Diet and Meds compliance  Instructions for follow-up, activity and diet:	Follow up with [facility]ocrine Clinic [address] [place] [address] [phone], consistent carbohydrate diet, continue insulin regimen as prescribed, keep log of your fingersticks to show Endocrinologist Principal Discharge DX:	Chest pain  Goal:	prevent reoccurrence of pain  Instructions for follow-up, activity and diet:	Follow up with your Cardiologist Dr. Guajardo on 9/14/ 17 at 1:30 pm, stress test and echocardiogram was normal  Secondary Diagnosis:	HTN (hypertension)  Goal:	Low Salt Diet  Instructions for follow-up, activity and diet:	Follow up with Your PMD or Cardiologist in 1-2 weeks, continue your antihypertensive meds  Secondary Diagnosis:	Uncontrolled diabetes mellitus  Goal:	Diet and Meds compliance  Instructions for follow-up, activity and diet:	Follow up with [facility]ocrine Clinic [address] [place] [address] [phone], consistent carbohydrate diet, continue insulin regimen as prescribed, keep log of your fingersticks to show Endocrinologist

## 2017-08-27 NOTE — DISCHARGE NOTE ADULT - PROVIDER TOKENS
DELONTE:'[id number]' TOKEN:'[id number]',FREE:[LAST:[Dr. Piedra-PCP],PHONE:[(   )    -],FAX:[(   )    -]] TOKEN:'[id number]',FREE:[LAST:[Dr. Piedra-PCP],PHONE:[(   )    -],FAX:[(   )    -]],FREE:[LAST:[[facility]],PHONE:[phone]],FAX:[(   )    -],ADDRESS:[[address] [place] [address]]]

## 2017-08-27 NOTE — DISCHARGE NOTE ADULT - HOSPITAL COURSE
61 y/o F with PMH of HTN, DM(non compliant with insulin), Obesity presented with the complaint of palpitations and chest pain. R/o ACS     + Chest pain, s/p Echo and Stress Tests:----------------------------------------------------  + Uncontrolled DM, Seen By endocrine: ( Started On Insulin) 63 y/o F with PMH of HTN, DM(non compliant with insulin), Obesity presented with the complaint of palpitations and chest pain. R/o ACS     + Chest pain, Stress Test:-NST: Normal Study* The left ventricle was normal in size.* Tracer uptake was homogeneous throughout the left  ventricle.* Normal study; no evidence for myocardial infarction or ischemia.* Gated wall motion analysis was performed, and shows normal wall motion.    + Uncontrolled DM, Seen By endocrine: ( Started On Insulin)       EKG: NSR at a rate of 65 with TWI in lead III, Qtc of 440  CE x 2: Negative   D-dimer: Negative   Gluc: 409  BNP: 18.65  Prelim CXR: no emergent findings   Endocrine C/S:  Type 2 diabetes mellitus with hyperglycemia, without long-term current use of insulin. Recommendation: - uncontrolled, HbA1c 12.5%  - strongly encouraged consistent carbohydrate diet and lifestyle changes including exercise. start weight based dose Lantus 30u and Humalog 10u TID with meals  continue with moderate correctional scale with meals and at bedtime. 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 nutrition consult, patient states she needs help reading nutrition labels.endocrine follow up at [address] [place] [address] [phone]  discharge plan: Basal/bolus, dose TBD. Consider GLP-1 agonist as outpatient to aid with weight loss.  CT head: No acute intracranial hemorrhage, mass effect, or evidence of acute territorial infarct.  NST: Normal Study  * The left ventricle was normal in size.  * Tracer uptake was homogeneous throughout the left  ventricle.  * Normal study; no evidence for myocardial infarction or  ischemia.  * Gated wall motion analysis was performed, and shows  normal wall motion.    D/w Cardiologist and Endocrine patient stable for d/c home today. Outpatient f/u at Endocrine clinic in 1-2 weeks and outpatient f/u with Cardiology for echo. 61 y/o F with PMH of HTN, DM(non compliant with insulin), Obesity presented with the complaint of palpitations and chest pain. R/o ACS     + Chest pain, Stress Test:-NST: Normal Study* The left ventricle was normal in size.* Tracer uptake was homogeneous throughout the left  ventricle.* Normal study; no evidence for myocardial infarction or ischemia.* Gated wall motion analysis was performed, and shows normal wall motion.    + Uncontrolled DM, Seen By endocrine: ( Started On Insulin)       EKG: NSR at a rate of 65 with TWI in lead III, Qtc of 440  CE x 2: Negative   D-dimer: Negative   Gluc: 409  BNP: 18.65  Prelim CXR: no emergent findings   Endocrine C/S:  Type 2 diabetes mellitus with hyperglycemia, without long-term current use of insulin. Recommendation: - uncontrolled, HbA1c 12.5%  - strongly encouraged consistent carbohydrate diet and lifestyle changes including exercise. start weight based dose Lantus 30u and Humalog 10u TID with meals  continue with moderate correctional scale with meals and at bedtime. 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 nutrition consult, patient states she needs help reading nutrition labels.endocrine follow up at [address] [place] [address] [phone]  discharge plan: Basal/bolus, dose TBD. Consider GLP-1 agonist as outpatient to aid with weight loss.  CT head: No acute intracranial hemorrhage, mass effect, or evidence of acute territorial infarct.  NST: Normal Study  * The left ventricle was normal in size.  * Tracer uptake was homogeneous throughout the left  ventricle.  * Normal study; no evidence for myocardial infarction or  ischemia.  * Gated wall motion analysis was performed, and shows  normal wall motion.    echo: 1. Increased relative wall thickness with normal left  ventricular mass index, consistent with concentric left  ventricular remodeling.  2. Normal left ventricular systolic function. No segmental  wall motion abnormalities.  3. Normal right ventricular size and function.  D/w Cardiologist and Endocrine patient stable for d/c home today. Outpatient f/u at Endocrine clinic in 1-2 weeks and outpatient f/u with Cardiology.

## 2017-08-27 NOTE — DISCHARGE NOTE ADULT - MEDICATION SUMMARY - MEDICATIONS TO STOP TAKING
I will STOP taking the medications listed below when I get home from the hospital:    HumaLOG Mix 75/25 subcutaneous suspension  -- 25 unit(s) subcutaneous once a day    HumaLOG Mix 75/25 subcutaneous suspension  -- 30 unit(s) subcutaneous once a day

## 2017-08-27 NOTE — DISCHARGE NOTE ADULT - MEDICATION SUMMARY - MEDICATIONS TO TAKE
I will START or STAY ON the medications listed below when I get home from the hospital:    BD erika needles 4mm to use with insulin pens 4 x a day dispense 1 month supply.  -- Indication: For Type 2 diabetes mellitus without complication, with long-term current use of insulin    Aspirin Enteric Coated 81 mg oral delayed release tablet  -- 1 tab(s) by mouth once a day  -- Indication: For Cardio protective     Lantus Solostar Pen 100 units/mL subcutaneous solution  -- 30 unit(s) subcutaneous once a day (at bedtime)  -- Do not drink alcoholic beverages when taking this medication.  It is very important that you take or use this exactly as directed.  Do not skip doses or discontinue unless directed by your doctor.  Keep in refrigerator.  Do not freeze.    -- Indication: For Type 2 diabetes mellitus with hyperglycemia, without long-term current use of insulin    HumaLOG KwikPen 100 units/mL subcutaneous solution  -- 10 unit(s) subcutaneous 2 times a day (before breakfast and lunch)  -- Do not drink alcoholic beverages when taking this medication.  It is very important that you take or use this exactly as directed.  Do not skip doses or discontinue unless directed by your doctor.  Keep in refrigerator.  Do not freeze.    -- Indication: For Type 2 diabetes mellitus with hyperglycemia, without long-term current use of insulin    HumaLOG KwikPen 100 units/mL subcutaneous solution  -- 8 unit(s) subcutaneous once a day before dinner   -- Do not drink alcoholic beverages when taking this medication.  It is very important that you take or use this exactly as directed.  Do not skip doses or discontinue unless directed by your doctor.  Keep in refrigerator.  Do not freeze.    -- Indication: For Type 2 diabetes mellitus with hyperglycemia, without long-term current use of insulin    losartan-hydrochlorothiazide 100mg-12.5mg oral tablet  -- 1 tab(s) by mouth once a day  -- Indication: For HTN (hypertension)    metoprolol tartrate 25 mg oral tablet  -- 1 tab(s) by mouth 2 times a day  -- Indication: For HTN (hypertension)

## 2017-08-27 NOTE — CONSULT NOTE ADULT - SUBJECTIVE AND OBJECTIVE BOX
Patient is a 62y old  Female who presents with a chief complaint of Left sided chest pain, SOB and palpitations (27 Aug 2017 11:34)      HPI:    61 y/o F with PMH of HTN, DM(non compliant with insulin), obesity presented with the complaint of palpitations and chest pain. As per the patient she has been having intermittent left sided chest pain, characterized as an electric type of pain, without any aggravating factors, with mild alleviation when she takes Aspirin, without any radiation of the pain, with a severity of 10/10. Patient stated that with the chest pain she would also get SOB and palpitations for which she went to see her PMD and was told to see a cardiologist. Patient had a stress test and TTE performed at the cardiologist office yesterday and was "negative ETT for ischemia. No sustained arrhythmia". After the stress test and when patient was going home she developed similar chest pain, and was perspiring and then decided to come to the ER. Patient stated that when she takes her insulin she would get palpitations and sweating excessively for that reason she has not taken her Insulin for the past 2 months. Patient denied any fevers, chills, N/V/D/C, headaches, abdominal pain, cough, melena, hematochezia, dysuria, recent travel, sick contact, pleuritic or positional chest pain.     On ED admission EKG revealed  NSR at a rate of 65 with TWI in lead III, Qtc of 440, CE x 1: Negative, D-dimer: Negative, Gluc: 409, BNP: 18.65. Prelim CXR: no emergent findings. Patient received 1x dose of Aspirin in the ED. When examined patient is resting in the stretcher and denied any current complaints. (26 Aug 2017 00:14)      PAST MEDICAL & SURGICAL HISTORY:  Obesity  Type 2 diabetes mellitus without complication, with long-term current use of insulin  HTN (hypertension)  H/O: hysterectomy      Review of Systems:   CONSTITUTIONAL: No fever, weight loss, or fatigue  EYES: No eye pain, visual disturbances, or discharge  ENMT:  No difficulty hearing, tinnitus, vertigo; No sinus or throat pain  NECK: No pain or stiffness  RESPIRATORY: No cough, wheezing, chills or hemoptysis; No shortness of breath  CARDIOVASCULAR: + chest pain, palpitations, dizziness, or leg swelling  GASTROINTESTINAL: No abdominal or epigastric pain. No nausea, vomiting, or hematemesis; No diarrhea or constipation. No melena or hematochezia.  GENITOURINARY: No dysuria, frequency, hematuria, or incontinence  NEUROLOGICAL: No headaches, memory loss, loss of strength, numbness, or tremors  SKIN: No itching, burning, rashes, or lesions   LYMPH NODES: No enlarged glands  ENDOCRINE: No heat or cold intolerance; No hair loss  MUSCULOSKELETAL: No joint pain or swelling; No muscle, back, or extremity pain  PSYCHIATRIC: No depression, anxiety, mood swings, or difficulty sleeping  HEME/LYMPH: No easy bruising, or bleeding gums  ALLERGY AND IMMUNOLOGIC: No hives or eczema    Allergies    Metamucil (Short breath)  morphine (Pruritus)    Social History: nonsmoker  no IVDA  lives with family  no ETOH    FAMILY HISTORY:  No pertinent family history in first degree relatives      MEDICATIONS  (STANDING):  metoprolol 25 milliGRAM(s) Oral two times a day  hydrochlorothiazide 12.5 milliGRAM(s) Oral daily  losartan 100 milliGRAM(s) Oral daily  heparin  Injectable 5000 Unit(s) SubCutaneous every 12 hours  insulin lispro (HumaLOG) corrective regimen sliding scale   SubCutaneous three times a day before meals  insulin lispro (HumaLOG) corrective regimen sliding scale   SubCutaneous at bedtime  dextrose 5%. 1000 milliLiter(s) (50 mL/Hr) IV Continuous <Continuous>  dextrose 50% Injectable 12.5 Gram(s) IV Push once  dextrose 50% Injectable 25 Gram(s) IV Push once  dextrose 50% Injectable 25 Gram(s) IV Push once  insulin glargine Injectable (LANTUS) 30 Unit(s) SubCutaneous at bedtime  insulin lispro Injectable (HumaLOG) 10 Unit(s) SubCutaneous three times a day before meals  aspirin  chewable 81 milliGRAM(s) Oral daily    MEDICATIONS  (PRN):  dextrose Gel 1 Dose(s) Oral once PRN Blood Glucose LESS THAN 70 milliGRAM(s)/deciliter  glucagon  Injectable 1 milliGRAM(s) IntraMuscular once PRN Glucose LESS THAN 70 milligrams/deciliter      CAPILLARY BLOOD GLUCOSE  268 (27 Aug 2017 08:52)  188 (26 Aug 2017 21:41)  194 (26 Aug 2017 16:42)  238 (26 Aug 2017 12:30)        I&O's Summary      PHYSICAL EXAM:  GENERAL: NAD, well-developed  HEAD:  Atraumatic, Normocephalic  EYES: EOMI, PERRLA, conjunctiva and sclera clear  NECK: Supple, No JVD  CHEST/LUNG: Clear to auscultation bilaterally; No wheeze  HEART: Regular rate and rhythm; No murmurs, rubs, or gallops  ABDOMEN: Soft, Nontender, Nondistended; Bowel sounds present  EXTREMITIES:  no edema, + Peripheral Pulses, No clubbing or cyanosis  PSYCH: AAOx3  NEUROLOGY: non-focal  SKIN: No rashes or lesions    LABS:                        13.4   4.97  )-----------( 214      ( 27 Aug 2017 06:31 )             42.2     08-27    137  |  101  |  16  ----------------------------<  295<H>  4.3   |  24  |  0.83    Ca    9.3      27 Aug 2017 06:31  Mg     1.8     08-27    TPro  6.6  /  Alb  3.8  /  TBili  0.4  /  DBili  x   /  AST  13  /  ALT  16  /  AlkPhos  87  08-25    PT/INR - ( 25 Aug 2017 19:06 )   PT: 9.8 SEC;   INR: 0.88          PTT - ( 25 Aug 2017 19:06 )  PTT:31.9 SEC  CARDIAC MARKERS ( 26 Aug 2017 01:00 )  x     / < 0.06 ng/mL / 66 u/L / 1.67 ng/mL / x      CARDIAC MARKERS ( 25 Aug 2017 19:06 )  x     / < 0.06 ng/mL / 75 u/L / 1.89 ng/mL / x              RADIOLOGY & ADDITIONAL TESTS:    Imaging Personally Reviewed:    Consultant(s) Notes Reviewed:      Care Discussed with Consultants/Other Providers:

## 2017-08-27 NOTE — PROGRESS NOTE ADULT - SUBJECTIVE AND OBJECTIVE BOX
Cardiovascular Disease Progress Note  (For Dr. Guajardo)    Overnight events: No acute events overnight. Ms. Mccall denies chest pain or SOB.   Otherwise review of systems negative    Objective Findings:  T(C): 36.3 (08-27-17 @ 05:16), Max: 36.8 (08-26-17 @ 20:26)  HR: 65 (08-27-17 @ 05:16) (63 - 67)  BP: 110/59 (08-27-17 @ 05:16) (107/62 - 127/63)  RR: 18 (08-27-17 @ 05:16) (18 - 18)  SpO2: 96% (08-27-17 @ 05:16) (96% - 99%)  Wt(kg): --  Daily     Daily       Physical Exam:  Gen: NAD  HEENT: EOMI  CV: RRR, normal S1 + S2, no m/r/g  Lungs: CTAB  Abd: soft, non-tender  Ext: No edema    Telemetry: Sinus; occasional PVDs.     Laboratory Data:                        13.4   4.97  )-----------( 214      ( 27 Aug 2017 06:31 )             42.2     08-27    137  |  101  |  16  ----------------------------<  295<H>  4.3   |  24  |  0.83    Ca    9.3      27 Aug 2017 06:31  Mg     1.8     08-27    TPro  6.6  /  Alb  3.8  /  TBili  0.4  /  DBili  x   /  AST  13  /  ALT  16  /  AlkPhos  87  08-25    PT/INR - ( 25 Aug 2017 19:06 )   PT: 9.8 SEC;   INR: 0.88          PTT - ( 25 Aug 2017 19:06 )  PTT:31.9 SEC  CARDIAC MARKERS ( 26 Aug 2017 01:00 )  x     / < 0.06 ng/mL / 66 u/L / 1.67 ng/mL / x      CARDIAC MARKERS ( 25 Aug 2017 19:06 )  x     / < 0.06 ng/mL / 75 u/L / 1.89 ng/mL / x              Inpatient Medications:  MEDICATIONS  (STANDING):  aspirin enteric coated 81 milliGRAM(s) Oral daily  metoprolol 25 milliGRAM(s) Oral two times a day  hydrochlorothiazide 12.5 milliGRAM(s) Oral daily  losartan 100 milliGRAM(s) Oral daily  heparin  Injectable 5000 Unit(s) SubCutaneous every 12 hours  insulin lispro (HumaLOG) corrective regimen sliding scale   SubCutaneous three times a day before meals  insulin lispro (HumaLOG) corrective regimen sliding scale   SubCutaneous at bedtime  dextrose 5%. 1000 milliLiter(s) (50 mL/Hr) IV Continuous <Continuous>  dextrose 50% Injectable 12.5 Gram(s) IV Push once  dextrose 50% Injectable 25 Gram(s) IV Push once  dextrose 50% Injectable 25 Gram(s) IV Push once  insulin glargine Injectable (LANTUS) 30 Unit(s) SubCutaneous at bedtime  insulin lispro Injectable (HumaLOG) 10 Unit(s) SubCutaneous three times a day before meals      Assessment: 62 year old woman with HTN and uncontrolled IDDM II presents with chest pain    #Chest pain-   Given high pre-test probability for CAD given uncontrolled DM and HTN, will further assess for ischemia with nuclear stress test.  Continue ASA and beta-blocker  Check TTE    #Uncontrolled IDDM II- basal/bolus insulin.   Endocrine consult noted.    #HTN- BP at goal on current regimen    #Obesity- patient counselled on diet and exercise.    Plan of care discussed at length with patient.      Over 35 minutes spent on total encounter; more than 50% of the visit was spent counseling and/or coordinating care by the attending physician.      Ja Gonzalez M.D.   Cardiovascular Disease  [phone] Cardiovascular Disease Progress Note  (For Dr. Guajardo)    Overnight events: No acute events overnight. Ms. Mccall denies chest pain or SOB.   Otherwise review of systems negative    Objective Findings:  T(C): 36.3 (08-27-17 @ 05:16), Max: 36.8 (08-26-17 @ 20:26)  HR: 65 (08-27-17 @ 05:16) (63 - 67)  BP: 110/59 (08-27-17 @ 05:16) (107/62 - 127/63)  RR: 18 (08-27-17 @ 05:16) (18 - 18)  SpO2: 96% (08-27-17 @ 05:16) (96% - 99%)  Wt(kg): --  Daily     Daily       Physical Exam:  Gen: NAD  HEENT: EOMI  CV: RRR, normal S1 + S2, no m/r/g  Lungs: CTAB  Abd: soft, non-tender  Ext: No edema    Telemetry: Sinus; occasional PVDs.     Laboratory Data:                        13.4   4.97  )-----------( 214      ( 27 Aug 2017 06:31 )             42.2     08-27    137  |  101  |  16  ----------------------------<  295<H>  4.3   |  24  |  0.83    Ca    9.3      27 Aug 2017 06:31  Mg     1.8     08-27    TPro  6.6  /  Alb  3.8  /  TBili  0.4  /  DBili  x   /  AST  13  /  ALT  16  /  AlkPhos  87  08-25    PT/INR - ( 25 Aug 2017 19:06 )   PT: 9.8 SEC;   INR: 0.88          PTT - ( 25 Aug 2017 19:06 )  PTT:31.9 SEC  CARDIAC MARKERS ( 26 Aug 2017 01:00 )  x     / < 0.06 ng/mL / 66 u/L / 1.67 ng/mL / x      CARDIAC MARKERS ( 25 Aug 2017 19:06 )  x     / < 0.06 ng/mL / 75 u/L / 1.89 ng/mL / x              Inpatient Medications:  MEDICATIONS  (STANDING):  aspirin enteric coated 81 milliGRAM(s) Oral daily  metoprolol 25 milliGRAM(s) Oral two times a day  hydrochlorothiazide 12.5 milliGRAM(s) Oral daily  losartan 100 milliGRAM(s) Oral daily  heparin  Injectable 5000 Unit(s) SubCutaneous every 12 hours  insulin lispro (HumaLOG) corrective regimen sliding scale   SubCutaneous three times a day before meals  insulin lispro (HumaLOG) corrective regimen sliding scale   SubCutaneous at bedtime  dextrose 5%. 1000 milliLiter(s) (50 mL/Hr) IV Continuous <Continuous>  dextrose 50% Injectable 12.5 Gram(s) IV Push once  dextrose 50% Injectable 25 Gram(s) IV Push once  dextrose 50% Injectable 25 Gram(s) IV Push once  insulin glargine Injectable (LANTUS) 30 Unit(s) SubCutaneous at bedtime  insulin lispro Injectable (HumaLOG) 10 Unit(s) SubCutaneous three times a day before meals      Assessment: 62 year old woman with HTN and uncontrolled IDDM II presents with chest pain    #Chest pain-   Given high pre-test probability for CAD given uncontrolled DM and HTN, will further assess for ischemia with nuclear stress test.  Continue ASA and beta-blocker  Check TTE    #Uncontrolled IDDM II- basal/bolus insulin.   Endocrine consult noted.    #HTN- BP at goal on current regimen    #Obesity- patient counselled on diet and exercise.    Plan of care discussed at length with patient.      Over 35 minutes spent on total encounter; more than 50% of the visit was spent counseling and/or coordinating care by the attending physician.      Ja Gonzalez M.D.   (For Dr. Guajardo)  Cardiovascular Disease  [phone]

## 2017-08-27 NOTE — CONSULT NOTE ADULT - ASSESSMENT
Patient is a 62y old  Female who presents with a chief complaint of Left sided chest pain, SOB and palpitations

## 2017-08-28 VITALS
DIASTOLIC BLOOD PRESSURE: 73 MMHG | RESPIRATION RATE: 18 BRPM | TEMPERATURE: 98 F | HEART RATE: 78 BPM | OXYGEN SATURATION: 98 % | SYSTOLIC BLOOD PRESSURE: 116 MMHG

## 2017-08-28 LAB
BASOPHILS # BLD AUTO: 0.04 K/UL — SIGNIFICANT CHANGE UP (ref 0–0.2)
BASOPHILS NFR BLD AUTO: 0.7 % — SIGNIFICANT CHANGE UP (ref 0–2)
BUN SERPL-MCNC: 16 MG/DL — SIGNIFICANT CHANGE UP (ref 7–23)
CALCIUM SERPL-MCNC: 9.6 MG/DL — SIGNIFICANT CHANGE UP (ref 8.4–10.5)
CHLORIDE SERPL-SCNC: 102 MMOL/L — SIGNIFICANT CHANGE UP (ref 98–107)
CO2 SERPL-SCNC: 23 MMOL/L — SIGNIFICANT CHANGE UP (ref 22–31)
CREAT SERPL-MCNC: 0.88 MG/DL — SIGNIFICANT CHANGE UP (ref 0.5–1.3)
EOSINOPHIL # BLD AUTO: 0.16 K/UL — SIGNIFICANT CHANGE UP (ref 0–0.5)
EOSINOPHIL NFR BLD AUTO: 2.7 % — SIGNIFICANT CHANGE UP (ref 0–6)
GLUCOSE SERPL-MCNC: 251 MG/DL — HIGH (ref 70–99)
HCT VFR BLD CALC: 46.5 % — HIGH (ref 34.5–45)
HGB BLD-MCNC: 15.2 G/DL — SIGNIFICANT CHANGE UP (ref 11.5–15.5)
IMM GRANULOCYTES # BLD AUTO: 0.06 # — SIGNIFICANT CHANGE UP
IMM GRANULOCYTES NFR BLD AUTO: 1 % — SIGNIFICANT CHANGE UP (ref 0–1.5)
LYMPHOCYTES # BLD AUTO: 2.53 K/UL — SIGNIFICANT CHANGE UP (ref 1–3.3)
LYMPHOCYTES # BLD AUTO: 42.7 % — SIGNIFICANT CHANGE UP (ref 13–44)
MAGNESIUM SERPL-MCNC: 1.9 MG/DL — SIGNIFICANT CHANGE UP (ref 1.6–2.6)
MCHC RBC-ENTMCNC: 26.4 PG — LOW (ref 27–34)
MCHC RBC-ENTMCNC: 32.7 % — SIGNIFICANT CHANGE UP (ref 32–36)
MCV RBC AUTO: 80.9 FL — SIGNIFICANT CHANGE UP (ref 80–100)
MONOCYTES # BLD AUTO: 0.49 K/UL — SIGNIFICANT CHANGE UP (ref 0–0.9)
MONOCYTES NFR BLD AUTO: 8.3 % — SIGNIFICANT CHANGE UP (ref 2–14)
NEUTROPHILS # BLD AUTO: 2.65 K/UL — SIGNIFICANT CHANGE UP (ref 1.8–7.4)
NEUTROPHILS NFR BLD AUTO: 44.6 % — SIGNIFICANT CHANGE UP (ref 43–77)
NRBC # FLD: 0 — SIGNIFICANT CHANGE UP
PLATELET # BLD AUTO: 242 K/UL — SIGNIFICANT CHANGE UP (ref 150–400)
PMV BLD: 10.6 FL — SIGNIFICANT CHANGE UP (ref 7–13)
POTASSIUM SERPL-MCNC: 4.2 MMOL/L — SIGNIFICANT CHANGE UP (ref 3.5–5.3)
POTASSIUM SERPL-SCNC: 4.2 MMOL/L — SIGNIFICANT CHANGE UP (ref 3.5–5.3)
RBC # BLD: 5.75 M/UL — HIGH (ref 3.8–5.2)
RBC # FLD: 13.4 % — SIGNIFICANT CHANGE UP (ref 10.3–14.5)
SODIUM SERPL-SCNC: 138 MMOL/L — SIGNIFICANT CHANGE UP (ref 135–145)
WBC # BLD: 5.93 K/UL — SIGNIFICANT CHANGE UP (ref 3.8–10.5)
WBC # FLD AUTO: 5.93 K/UL — SIGNIFICANT CHANGE UP (ref 3.8–10.5)

## 2017-08-28 PROCEDURE — 93306 TTE W/DOPPLER COMPLETE: CPT | Mod: 26

## 2017-08-28 RX ORDER — ENOXAPARIN SODIUM 100 MG/ML
30 INJECTION SUBCUTANEOUS
Qty: 1 | Refills: 0
Start: 2017-08-28 | End: 2017-09-27

## 2017-08-28 RX ORDER — INSULIN LISPRO 100 [IU]/ML
25 INJECTION, SUSPENSION SUBCUTANEOUS
Qty: 0 | Refills: 0 | COMMUNITY

## 2017-08-28 RX ORDER — INSULIN LISPRO 100 [IU]/ML
30 INJECTION, SUSPENSION SUBCUTANEOUS
Qty: 0 | Refills: 0 | COMMUNITY

## 2017-08-28 RX ORDER — INSULIN LISPRO 100/ML
10 VIAL (ML) SUBCUTANEOUS
Qty: 1 | Refills: 0
Start: 2017-08-28 | End: 2017-09-27

## 2017-08-28 RX ORDER — INSULIN LISPRO 100/ML
8 VIAL (ML) SUBCUTANEOUS
Qty: 1 | Refills: 0
Start: 2017-08-28 | End: 2017-09-27

## 2017-08-28 RX ADMIN — Medication 10 UNIT(S): at 12:15

## 2017-08-28 RX ADMIN — Medication 12.5 MILLIGRAM(S): at 05:30

## 2017-08-28 RX ADMIN — Medication 10 UNIT(S): at 09:02

## 2017-08-28 RX ADMIN — Medication 25 MILLIGRAM(S): at 17:33

## 2017-08-28 RX ADMIN — HEPARIN SODIUM 5000 UNIT(S): 5000 INJECTION INTRAVENOUS; SUBCUTANEOUS at 05:30

## 2017-08-28 RX ADMIN — Medication 10 UNIT(S): at 17:32

## 2017-08-28 RX ADMIN — Medication 4: at 09:01

## 2017-08-28 RX ADMIN — Medication 25 MILLIGRAM(S): at 05:29

## 2017-08-28 RX ADMIN — Medication 2: at 17:32

## 2017-08-28 RX ADMIN — Medication 81 MILLIGRAM(S): at 12:12

## 2017-08-28 NOTE — PROGRESS NOTE ADULT - PROBLEM SELECTOR PLAN 2
wt loss strategies discussed with patient in detail, all questions answered   will monitor as outpatient

## 2017-08-28 NOTE — DIETITIAN INITIAL EVALUATION ADULT. - OTHER INFO
Nutrition consult for DM education.  Patient reports good PO intake at present and prior to admission. Patient denies any nausea/vomiting/diarrhea/constipation or difficulty chewing and swallowing. RD provided the patient with extensive verbal and written DM diet education; including, carb counting, label reading, meal planning, and carbohydrate sources.  Pt. was also made aware of their current HbA1c and HbA1c goal.

## 2017-08-28 NOTE — PROGRESS NOTE ADULT - SUBJECTIVE AND OBJECTIVE BOX
Patient is a 62y old  Female who presents with a chief complaint of Left sided chest pain, SOB and palpitations (27 Aug 2017 11:34)      SUBJECTIVE / OVERNIGHT EVENTS: No nausea, vomiting or diarrhea, no fever or chills, no dizziness or chest pain, no dysuria or hematuria, no jt pain or swelling    no dizziness  no chest pain     MEDICATIONS  (STANDING):  metoprolol 25 milliGRAM(s) Oral two times a day  hydrochlorothiazide 12.5 milliGRAM(s) Oral daily  losartan 100 milliGRAM(s) Oral daily  heparin  Injectable 5000 Unit(s) SubCutaneous every 12 hours  insulin lispro (HumaLOG) corrective regimen sliding scale   SubCutaneous three times a day before meals  insulin lispro (HumaLOG) corrective regimen sliding scale   SubCutaneous at bedtime  dextrose 5%. 1000 milliLiter(s) (50 mL/Hr) IV Continuous <Continuous>  dextrose 50% Injectable 12.5 Gram(s) IV Push once  dextrose 50% Injectable 25 Gram(s) IV Push once  dextrose 50% Injectable 25 Gram(s) IV Push once  insulin glargine Injectable (LANTUS) 30 Unit(s) SubCutaneous at bedtime  insulin lispro Injectable (HumaLOG) 10 Unit(s) SubCutaneous three times a day before meals  aspirin  chewable 81 milliGRAM(s) Oral daily    MEDICATIONS  (PRN):  dextrose Gel 1 Dose(s) Oral once PRN Blood Glucose LESS THAN 70 milliGRAM(s)/deciliter  glucagon  Injectable 1 milliGRAM(s) IntraMuscular once PRN Glucose LESS THAN 70 milligrams/deciliter        CAPILLARY BLOOD GLUCOSE  142 (28 Aug 2017 12:03)  222 (28 Aug 2017 08:29)  92 (27 Aug 2017 21:31)  309 (27 Aug 2017 17:02)  218 (27 Aug 2017 14:23)        I&O's Summary    PHYSICAL EXAM:  GENERAL: NAD, well-developed  HEAD:  Atraumatic, Normocephalic  EYES: EOMI, PERRLA, conjunctiva and sclera clear  NECK: Supple, No JVD  CHEST/LUNG: Clear to auscultation bilaterally; No wheeze  HEART: Regular rate and rhythm; No murmurs, rubs, or gallops  ABDOMEN: Soft, Nontender, Nondistended; Bowel sounds present  EXTREMITIES:  no edema, + Peripheral Pulses, No clubbing or cyanosis  PSYCH: AAOx3  NEUROLOGY: non-focal  SKIN: No rashes or lesions    LABS:                        15.2   5.93  )-----------( 242      ( 28 Aug 2017 07:40 )             46.5     08-28    138  |  102  |  16  ----------------------------<  251<H>  4.2   |  23  |  0.88    Ca    9.6      28 Aug 2017 07:40  Mg     1.9     08-28                  Consultant(s) Notes Reviewed:      Care Discussed with Consultants/Other Providers:    Contact Number, Dr Montalvo [phone]

## 2017-08-28 NOTE — DIETITIAN INITIAL EVALUATION ADULT. - PERTINENT LABORATORY DATA
08-28 Na138 mmol/L Glu 251 mg/dL<H> K+ 4.2 mmol/L Cr  0.88 mg/dL BUN 16 mg/dL Phos n/a   Alb n/a   PAB n/a

## 2017-08-28 NOTE — PROGRESS NOTE ADULT - PROBLEM SELECTOR PLAN 1
counseled again re compliance with diet and DM meds  promises to follow up with endocrinologist and PCP  no hypoglycemia noted

## 2017-08-28 NOTE — PROGRESS NOTE ADULT - ASSESSMENT
62 year old woman with HTN and uncontrolled IDDM II presents with atypical chest pain    1. cv stable   no chest pain or sob   stress test: normal findings no evidence of MI/ ischemia, normal LV fx   no acute events on tele   Continue ASA and beta-blocker  Echo can be done as outpatient   dc today     2. Uncontrolled IDDM II  endo c/s noted     3. HTN-  BP at goal on current regimen    f/u appt for 9/14/ 17 at 1:30 pm

## 2017-08-28 NOTE — DIETITIAN INITIAL EVALUATION ADULT. - NS AS NUTRI INTERV ED CONTENT
RD provided the patient with extensive verbal and written DM diet education; including, carb counting, label reading, meal planning, and carbohydrate sources.

## 2017-08-28 NOTE — PROGRESS NOTE ADULT - SUBJECTIVE AND OBJECTIVE BOX
CARDIOLOGY FOLLOW UP - Dr. Guajardo    CC no chest pain or sob       PHYSICAL EXAM:  T(C): 36.8 (08-28-17 @ 05:23), Max: 36.8 (08-28-17 @ 05:23)  HR: 63 (08-28-17 @ 12:09) (63 - 75)  BP: 113/73 (08-28-17 @ 12:09) (113/73 - 131/80)  RR: 16 (08-28-17 @ 12:09) (16 - 17)  SpO2: 98% (08-28-17 @ 12:09) (96% - 98%)  Wt(kg): --  I&O's Summary      Appearance: Normal	  Cardiovascular: Normal S1 S2,RRR, No JVD, No murmurs  Respiratory: Lungs clear to auscultation	  Gastrointestinal:  Soft, Non-tender, + BS	  Extremities: Normal range of motion, No clubbing, cyanosis or edema        MEDICATIONS  (STANDING):  metoprolol 25 milliGRAM(s) Oral two times a day  hydrochlorothiazide 12.5 milliGRAM(s) Oral daily  losartan 100 milliGRAM(s) Oral daily  heparin  Injectable 5000 Unit(s) SubCutaneous every 12 hours  insulin lispro (HumaLOG) corrective regimen sliding scale   SubCutaneous three times a day before meals  insulin lispro (HumaLOG) corrective regimen sliding scale   SubCutaneous at bedtime  dextrose 5%. 1000 milliLiter(s) (50 mL/Hr) IV Continuous <Continuous>  dextrose 50% Injectable 12.5 Gram(s) IV Push once  dextrose 50% Injectable 25 Gram(s) IV Push once  dextrose 50% Injectable 25 Gram(s) IV Push once  insulin glargine Injectable (LANTUS) 30 Unit(s) SubCutaneous at bedtime  insulin lispro Injectable (HumaLOG) 10 Unit(s) SubCutaneous three times a day before meals  aspirin  chewable 81 milliGRAM(s) Oral daily      TELEMETRY: 	NSR/ PVC     ECG:  	  RADIOLOGY:   DIAGNOSTIC TESTING:  [ ] Echocardiogram:  [ ]  Catheterization:  [x ] Stress Test:    < from: Nuclear Stress Test-Exercise (08.27.17 @ 13:43) >  ------------------------------------------------------------------------  GATED ANALYSIS:  Post-stress gated wall motion analysis was performed (LVEF  = 70 %;LVEDV = 62 ml.) revealing normal LV function.  RV  size and function appeared normal.  ------------------------------------------------------------------------  IMPRESSIONS:Normal Study  * The left ventricle was normal in size.  * Tracer uptake was homogeneous throughout the left  ventricle.  * Normal study; no evidence for myocardial infarction or  ischemia.  * Gated wallmotion analysis was performed, and shows  normal wall motion.  ------------------------------------------------------------------------    < end of copied text >    OTHER: 	    LABS:	 	                                15.2   5.93  )-----------( 242      ( 28 Aug 2017 07:40 )             46.5     08-28    138  |  102  |  16  ----------------------------<  251<H>  4.2   |  23  |  0.88    Ca    9.6      28 Aug 2017 07:40  Mg     1.9     08-28

## 2017-08-28 NOTE — DIETITIAN INITIAL EVALUATION ADULT. - NS FNS WEIGHT CHANGE REASON
Pt endorses Wt. loss. However, states Wt. is consistent with admission Wt. ( 253 pound.)/unintentional

## 2017-09-06 PROBLEM — Z00.00 ENCOUNTER FOR PREVENTIVE HEALTH EXAMINATION: Status: ACTIVE | Noted: 2017-09-06

## 2017-09-07 ENCOUNTER — APPOINTMENT (OUTPATIENT)
Dept: ENDOCRINOLOGY | Facility: CLINIC | Age: 62
End: 2017-09-07
Payer: COMMERCIAL

## 2017-09-07 ENCOUNTER — APPOINTMENT (OUTPATIENT)
Dept: ENDOCRINOLOGY | Facility: CLINIC | Age: 62
End: 2017-09-07

## 2017-09-07 DIAGNOSIS — E11.9 TYPE 2 DIABETES MELLITUS W/OUT COMPLICATIONS: ICD-10-CM

## 2017-09-07 LAB — HBA1C MFR BLD HPLC: 12.5

## 2017-09-07 PROCEDURE — G0108 DIAB MANAGE TRN  PER INDIV: CPT

## 2017-09-07 RX ORDER — METFORMIN ER 500 MG 500 MG/1
500 TABLET ORAL TWICE DAILY
Qty: 1 | Refills: 1 | Status: ACTIVE | COMMUNITY
Start: 2017-09-07 | End: 1900-01-01

## 2017-10-10 ENCOUNTER — APPOINTMENT (OUTPATIENT)
Dept: ENDOCRINOLOGY | Facility: CLINIC | Age: 62
End: 2017-10-10

## 2018-01-03 ENCOUNTER — RX RENEWAL (OUTPATIENT)
Age: 63
End: 2018-01-03

## 2020-05-05 ENCOUNTER — APPOINTMENT (OUTPATIENT)
Dept: CARDIOLOGY | Facility: CLINIC | Age: 65
End: 2020-05-05

## 2021-11-12 NOTE — DIETITIAN INITIAL EVALUATION ADULT. - SIGNS/SYMPTOMS
DISPLAY PLAN FREE TEXT DISPLAY PLAN FREE TEXT DISPLAY PLAN FREE TEXT DISPLAY PLAN FREE TEXT As evidenced by Pt not able to completely verbalize recommended diet modifications.

## 2022-03-08 NOTE — DIETITIAN INITIAL EVALUATION ADULT. - PATIENT PROFILE REVIEWED
[facility]    Ms. Angeles Prajapati is a 56-year-old female with history of diastolic dysfunction, hypertension, obesity    Patient is here today for cardiac evaluation. Patient was admitted to hospital in 04/2020 with acute on chronic shortness of breath and PEA arrest requiring prolonged intubation. She was found to have a pneumonia and sepsis. Patient also was found to have elevated troponin which was thought to be due to type II MI. Patient had an echocardiogram and nuclear stress test.  EF was normal.  Nuclear stress test was low risk in 04/2020. Patient is here today for cardiac evaluation. She denies any chest pain or chest tightness. She denies any lower extremity swelling or PND. She denies palpitation, presyncope or syncope. She does have mild dyspnea on moderate exertion. Denies any nausea, vomiting, abdominal pain, fever, chills, sputum production. No hematuria or other bleeding complaints    Past Medical History:   Diagnosis Date    CAD (coronary artery disease)     mi 2013    Hypertension     MI, old        Review of Systems:  Cardiac symptoms as noted above in HPI. All others negative. Denies fatigue, malaise, skin rash, joint pain, blurring vision, photophobia, neck pain, hemoptysis, chronic cough, nausea, vomiting, hematuria, burning micturition, BRBPR, chronic headaches. Current Outpatient Medications   Medication Sig    methocarbamoL (Robaxin-750) 750 mg tablet Take 1 Tablet by mouth every six (6) hours as needed for Muscle Spasm(s) or Pain. Indications: muscle spasm    losartan (COZAAR) 100 mg tablet Take 1 Tablet by mouth daily.  metoprolol tartrate (LOPRESSOR) 50 mg tablet TAKE ONE TABLET BY MOUTH TWICE A DAY FOR MYOCARDIAL REINFACTION PREVENTION    atorvastatin (LIPITOR) 80 mg tablet TAKE ONE TABLET BY MOUTH DAILY AT NIGHT.  INDICATIONS: EXCESSIVE FAT IN THE BLOOD, TREATMENT TO PREVENT A HEART ATTACK    amLODIPine (NORVASC) 5 mg tablet TAKE ONE TABLET BY MOUTH DAILY    hydroCHLOROthiazide (HYDRODIURIL) 25 mg tablet TAKE ONE TABLET BY MOUTH DAILY    clopidogreL (PLAVIX) 75 mg tab TAKE ONE TABLET BY MOUTH DAILY    aspirin 81 mg chewable tablet Take 1 Tab by mouth daily. No current facility-administered medications for this visit. Past Surgical History:   Procedure Laterality Date    HX CORONARY STENT PLACEMENT         Allergies and Sensitivities:  No Known Allergies    Family History:  Family History   Problem Relation Age of Onset    Hypertension Mother     Stroke Mother     No Known Problems Father        Social History:  Social History     Tobacco Use    Smoking status: Former Smoker     Packs/day: 1.00     Years: 40.00     Pack years: 40.00    Smokeless tobacco: Never Used   Vaping Use    Vaping Use: Never used   Substance Use Topics    Alcohol use: No    Drug use: No     She  reports that she has quit smoking. She has a 40.00 pack-year smoking history. She has never used smokeless tobacco.  She  reports no history of alcohol use. Physical Exam:  BP Readings from Last 3 Encounters:   03/08/22 (!) 158/82   02/27/22 (!) 158/97   12/06/21 136/88         Pulse Readings from Last 3 Encounters:   03/08/22 71   02/27/22 66   12/06/21 62          Wt Readings from Last 3 Encounters:   03/08/22 124.9 kg (275 lb 6.4 oz)   02/27/22 115.2 kg (253 lb 15.5 oz)   12/06/21 126.6 kg (279 lb)       Constitutional: Oriented to person, place, and time. HENT: Head: Normocephalic and atraumatic. Neck: No JVD present. Carotid bruit is not appreciated. Cardiovascular: Regular rhythm. No murmur, gallop or rubs appreciated  Lung: Breath sounds normal. No respiratory distress. No ronchi or rales appreciated  Abdominal: No tenderness. No rebound and no guarding. Musculoskeletal: There is no lower extremity edema. No cynosis  Lymphadenopathy:  No cervical or supraclavicular adenopathy appriciated. Neurological: No gross motor deficit noted. Skin: No visible skin rash noted. No Ear discharge noted  Psychiatric: Normal mood and affect. LABS:   @  Lab Results   Component Value Date/Time    WBC 5.5 01/05/2021 01:34 PM    HGB 14.3 01/05/2021 01:34 PM    HCT 46.5 (H) 01/05/2021 01:34 PM    PLATELET 613 32/22/4191 01:34 PM    MCV 96.7 01/05/2021 01:34 PM     Lab Results   Component Value Date/Time    Sodium 142 12/06/2021 01:32 PM    Potassium 4.2 12/06/2021 01:32 PM    Chloride 99 (L) 12/06/2021 01:32 PM    CO2 37 (H) 12/06/2021 01:32 PM    Glucose 96 12/06/2021 01:32 PM    BUN 27 (H) 12/06/2021 01:32 PM    Creatinine 0.98 12/06/2021 01:32 PM     Lipids Latest Ref Rng & Units 12/6/2021 1/5/2021 4/14/2020 4/9/2020 4/7/2020   Chol, Total <200 MG/ 170 148 - -   HDL 40 - 60 MG/DL 49 48 30(L) - -   LDL 0 - 100 MG/DL 91 107. 2(H) 88 - -   Trig <150 MG/DL 85 74 150 209(H) 172(H)   Chol/HDL Ratio 0 - 5.0   3.2 3.5 4. 9(H) - -   Some recent data might be hidden     Lab Results   Component Value Date/Time    ALT (SGPT) 28 12/06/2021 01:32 PM     No results found for: HBA1C, NWS9HQPD, PQE6ZWPE  Lab Results   Component Value Date/Time    TSH 0.942 04/09/2020 02:57 AM     NM CARDIAC SPECT W STRS/REST MULT 04/14/2020 4/14/2020  Wall Motion: Normal.  Ejection fraction calculated at 57 %. Artifacts: Mild apical thinning, probably artifactual.  Non-reversible myocardial defects: None  Reversible myocardial defects: None TID= 0.85  Impression  IMPRESSION:  1. No evidence of reversible myocardial ischemia. 2. This is a low risk study. Signed by: Daniella Cruz MD on 4/14/2020  2:54 PM    STRESS TEST (EST, PHARM, NUC, ECHO etc)    CATHETERIZATION    IMPRESSION & PLAN:  Ms. Rukhsana Almanza is a 26-year-old female    Diastolic dysfunction:  Currently she is on losartan, metoprolol, amlodipine, hydrochlorothiazide.   She has no significant fluid overload on exam  We will check echocardiogram to evaluate EF with dyspnea. Hypertension:  /82. Currently on 4 antihypertensives. Recommend to continue same  Echo ordered to rule out hypertensive cardiovascular heart disease with some dyspnea    Type II MI:  Patient had acute on chronic respiratory failure and PEA arrest requiring prolonged intubation in 04/2020. Elevation of troponin noted at the time which was thought to be type II MI with physiologic stress. Nuclear stress test in 04/2020, low risk and EF was normal  Currently without any angina. Continue risk factor modification. Currently on aspirin and Plavix. It is reasonable to stop Plavix from cardiovascular standpoint at this time. This plan was discussed with patient who is in agreement. Thank you for allowing me to participate in patient care. Please feel free to call me if you have any question or concern. Nirav Anthony MD  Please note: This document has been produced using voice recognition software. Unrecognized errors in transcription may be present. yes

## 2022-04-22 ENCOUNTER — EMERGENCY (EMERGENCY)
Facility: HOSPITAL | Age: 67
LOS: 0 days | Discharge: HOME | End: 2022-04-22
Attending: STUDENT IN AN ORGANIZED HEALTH CARE EDUCATION/TRAINING PROGRAM
Payer: MEDICARE

## 2022-04-22 VITALS
SYSTOLIC BLOOD PRESSURE: 147 MMHG | DIASTOLIC BLOOD PRESSURE: 89 MMHG | HEART RATE: 103 BPM | TEMPERATURE: 98 F | RESPIRATION RATE: 18 BRPM | OXYGEN SATURATION: 99 %

## 2022-04-22 VITALS
HEIGHT: 67 IN | OXYGEN SATURATION: 97 % | RESPIRATION RATE: 18 BRPM | TEMPERATURE: 99 F | SYSTOLIC BLOOD PRESSURE: 163 MMHG | WEIGHT: 244.93 LBS | HEART RATE: 87 BPM | DIASTOLIC BLOOD PRESSURE: 94 MMHG

## 2022-04-22 DIAGNOSIS — R11.0 NAUSEA: ICD-10-CM

## 2022-04-22 DIAGNOSIS — Z91.018 ALLERGY TO OTHER FOODS: ICD-10-CM

## 2022-04-22 DIAGNOSIS — R10.9 UNSPECIFIED ABDOMINAL PAIN: ICD-10-CM

## 2022-04-22 DIAGNOSIS — E66.9 OBESITY, UNSPECIFIED: ICD-10-CM

## 2022-04-22 DIAGNOSIS — I10 ESSENTIAL (PRIMARY) HYPERTENSION: ICD-10-CM

## 2022-04-22 DIAGNOSIS — E11.9 TYPE 2 DIABETES MELLITUS WITHOUT COMPLICATIONS: ICD-10-CM

## 2022-04-22 DIAGNOSIS — Z88.6 ALLERGY STATUS TO ANALGESIC AGENT: ICD-10-CM

## 2022-04-22 DIAGNOSIS — Z79.4 LONG TERM (CURRENT) USE OF INSULIN: ICD-10-CM

## 2022-04-22 DIAGNOSIS — Z90.710 ACQUIRED ABSENCE OF BOTH CERVIX AND UTERUS: Chronic | ICD-10-CM

## 2022-04-22 LAB
ALBUMIN SERPL ELPH-MCNC: 3.6 G/DL — SIGNIFICANT CHANGE UP (ref 3.3–5)
ALP SERPL-CCNC: 83 U/L — SIGNIFICANT CHANGE UP (ref 40–120)
ALT FLD-CCNC: 19 U/L — SIGNIFICANT CHANGE UP (ref 12–78)
ANION GAP SERPL CALC-SCNC: 3 MMOL/L — LOW (ref 5–17)
APPEARANCE UR: CLEAR — SIGNIFICANT CHANGE UP
AST SERPL-CCNC: 15 U/L — SIGNIFICANT CHANGE UP (ref 15–37)
BASOPHILS # BLD AUTO: 0.03 K/UL — SIGNIFICANT CHANGE UP (ref 0–0.2)
BASOPHILS NFR BLD AUTO: 0.5 % — SIGNIFICANT CHANGE UP (ref 0–2)
BILIRUB SERPL-MCNC: 0.6 MG/DL — SIGNIFICANT CHANGE UP (ref 0.2–1.2)
BILIRUB UR-MCNC: NEGATIVE — SIGNIFICANT CHANGE UP
BUN SERPL-MCNC: 11 MG/DL — SIGNIFICANT CHANGE UP (ref 7–23)
CALCIUM SERPL-MCNC: 9.4 MG/DL — SIGNIFICANT CHANGE UP (ref 8.5–10.1)
CHLORIDE SERPL-SCNC: 107 MMOL/L — SIGNIFICANT CHANGE UP (ref 96–108)
CO2 SERPL-SCNC: 30 MMOL/L — SIGNIFICANT CHANGE UP (ref 22–31)
COLOR SPEC: YELLOW — SIGNIFICANT CHANGE UP
CREAT SERPL-MCNC: 0.83 MG/DL — SIGNIFICANT CHANGE UP (ref 0.5–1.3)
DIFF PNL FLD: NEGATIVE — SIGNIFICANT CHANGE UP
EGFR: 77 ML/MIN/1.73M2 — SIGNIFICANT CHANGE UP
EOSINOPHIL # BLD AUTO: 0.18 K/UL — SIGNIFICANT CHANGE UP (ref 0–0.5)
EOSINOPHIL NFR BLD AUTO: 3 % — SIGNIFICANT CHANGE UP (ref 0–6)
GLUCOSE SERPL-MCNC: 112 MG/DL — HIGH (ref 70–99)
GLUCOSE UR QL: NEGATIVE MG/DL — SIGNIFICANT CHANGE UP
HCT VFR BLD CALC: 43.2 % — SIGNIFICANT CHANGE UP (ref 34.5–45)
HGB BLD-MCNC: 14 G/DL — SIGNIFICANT CHANGE UP (ref 11.5–15.5)
IMM GRANULOCYTES NFR BLD AUTO: 0.8 % — SIGNIFICANT CHANGE UP (ref 0–1.5)
KETONES UR-MCNC: NEGATIVE — SIGNIFICANT CHANGE UP
LACTATE SERPL-SCNC: 1 MMOL/L — SIGNIFICANT CHANGE UP (ref 0.7–2)
LEUKOCYTE ESTERASE UR-ACNC: NEGATIVE — SIGNIFICANT CHANGE UP
LIDOCAIN IGE QN: 181 U/L — SIGNIFICANT CHANGE UP (ref 73–393)
LYMPHOCYTES # BLD AUTO: 2.32 K/UL — SIGNIFICANT CHANGE UP (ref 1–3.3)
LYMPHOCYTES # BLD AUTO: 38.3 % — SIGNIFICANT CHANGE UP (ref 13–44)
MCHC RBC-ENTMCNC: 26.3 PG — LOW (ref 27–34)
MCHC RBC-ENTMCNC: 32.4 G/DL — SIGNIFICANT CHANGE UP (ref 32–36)
MCV RBC AUTO: 81.2 FL — SIGNIFICANT CHANGE UP (ref 80–100)
MONOCYTES # BLD AUTO: 0.52 K/UL — SIGNIFICANT CHANGE UP (ref 0–0.9)
MONOCYTES NFR BLD AUTO: 8.6 % — SIGNIFICANT CHANGE UP (ref 2–14)
NEUTROPHILS # BLD AUTO: 2.96 K/UL — SIGNIFICANT CHANGE UP (ref 1.8–7.4)
NEUTROPHILS NFR BLD AUTO: 48.8 % — SIGNIFICANT CHANGE UP (ref 43–77)
NITRITE UR-MCNC: NEGATIVE — SIGNIFICANT CHANGE UP
NRBC # BLD: 0 /100 WBCS — SIGNIFICANT CHANGE UP (ref 0–0)
PH UR: 6 — SIGNIFICANT CHANGE UP (ref 5–8)
PLATELET # BLD AUTO: 247 K/UL — SIGNIFICANT CHANGE UP (ref 150–400)
POTASSIUM SERPL-MCNC: 4.3 MMOL/L — SIGNIFICANT CHANGE UP (ref 3.5–5.3)
POTASSIUM SERPL-SCNC: 4.3 MMOL/L — SIGNIFICANT CHANGE UP (ref 3.5–5.3)
PROT SERPL-MCNC: 7.3 GM/DL — SIGNIFICANT CHANGE UP (ref 6–8.3)
PROT UR-MCNC: NEGATIVE MG/DL — SIGNIFICANT CHANGE UP
RBC # BLD: 5.32 M/UL — HIGH (ref 3.8–5.2)
RBC # FLD: 13.2 % — SIGNIFICANT CHANGE UP (ref 10.3–14.5)
SODIUM SERPL-SCNC: 140 MMOL/L — SIGNIFICANT CHANGE UP (ref 135–145)
SP GR SPEC: 1.02 — SIGNIFICANT CHANGE UP (ref 1.01–1.02)
UROBILINOGEN FLD QL: NEGATIVE MG/DL — SIGNIFICANT CHANGE UP
WBC # BLD: 6.06 K/UL — SIGNIFICANT CHANGE UP (ref 3.8–10.5)
WBC # FLD AUTO: 6.06 K/UL — SIGNIFICANT CHANGE UP (ref 3.8–10.5)

## 2022-04-22 PROCEDURE — 99285 EMERGENCY DEPT VISIT HI MDM: CPT

## 2022-04-22 PROCEDURE — 74177 CT ABD & PELVIS W/CONTRAST: CPT | Mod: 26,MA

## 2022-04-22 RX ORDER — LOSARTAN/HYDROCHLOROTHIAZIDE 100MG-25MG
1 TABLET ORAL
Qty: 0 | Refills: 0 | DISCHARGE

## 2022-04-22 RX ORDER — LOSARTAN POTASSIUM 100 MG/1
1 TABLET, FILM COATED ORAL
Qty: 0 | Refills: 0 | DISCHARGE

## 2022-04-22 RX ORDER — ASPIRIN/CALCIUM CARB/MAGNESIUM 324 MG
1 TABLET ORAL
Qty: 0 | Refills: 0 | DISCHARGE

## 2022-04-22 RX ORDER — SEMAGLUTIDE 0.68 MG/ML
0 INJECTION, SOLUTION SUBCUTANEOUS
Qty: 0 | Refills: 0 | DISCHARGE

## 2022-04-22 RX ORDER — INSULIN ASPART 100 [IU]/ML
0 INJECTION, SOLUTION SUBCUTANEOUS
Qty: 0 | Refills: 0 | DISCHARGE

## 2022-04-22 RX ORDER — METOPROLOL TARTRATE 50 MG
1 TABLET ORAL
Qty: 0 | Refills: 0 | DISCHARGE

## 2022-04-22 NOTE — ED PROVIDER NOTE - CLINICAL SUMMARY MEDICAL DECISION MAKING FREE TEXT BOX
pt presents today with constipation and abdominal pain since Monday, labs/ct to rule out obstruction

## 2022-04-22 NOTE — ED ADULT NURSE NOTE - NSICDXPASTMEDICALHX_GEN_ALL_CORE_FT
PAST MEDICAL HISTORY:  HTN (hypertension)     Obesity     Type 2 diabetes mellitus without complication, with long-term current use of insulin

## 2022-04-22 NOTE — ED PROVIDER NOTE - PATIENT PORTAL LINK FT
You can access the FollowMyHealth Patient Portal offered by [facility] by registering at the following website: [email or web address]. By joining [employer] [facility]’s FollowMyHealth portal, you will also be able to view your health information using other applications (apps) compatible with our system.

## 2022-04-22 NOTE — ED ADULT TRIAGE NOTE - CHIEF COMPLAINT QUOTE
p/w generalized abdominal pain, started Monday, constant, no radiation, pt states "mike been constipation" denies urine issues, denies any other symptoms. NAD noted.

## 2022-04-22 NOTE — ED PROVIDER NOTE - OBJECTIVE STATEMENT
66 year old female with h/o HTN and DM presents today c/o abdominal pain since Monday, pt describes constant cramping pain with intermittent episodes of sharp pains rated 9/10, associated with mild nausea this morning, pt states that she has been constipated since Monday, has take magnesium citrate without relief and reports not passing gas since Monday as well

## 2022-04-24 LAB
CULTURE RESULTS: SIGNIFICANT CHANGE UP
SPECIMEN SOURCE: SIGNIFICANT CHANGE UP

## 2022-08-12 ENCOUNTER — NON-APPOINTMENT (OUTPATIENT)
Age: 67
End: 2022-08-12

## 2022-08-14 ENCOUNTER — EMERGENCY (EMERGENCY)
Facility: HOSPITAL | Age: 67
LOS: 1 days | Discharge: HOME | End: 2022-08-14
Attending: STUDENT IN AN ORGANIZED HEALTH CARE EDUCATION/TRAINING PROGRAM | Admitting: STUDENT IN AN ORGANIZED HEALTH CARE EDUCATION/TRAINING PROGRAM

## 2022-08-14 VITALS
TEMPERATURE: 98 F | SYSTOLIC BLOOD PRESSURE: 138 MMHG | DIASTOLIC BLOOD PRESSURE: 66 MMHG | HEIGHT: 67 IN | OXYGEN SATURATION: 100 % | RESPIRATION RATE: 20 BRPM | HEART RATE: 87 BPM

## 2022-08-14 DIAGNOSIS — Z90.710 ACQUIRED ABSENCE OF BOTH CERVIX AND UTERUS: Chronic | ICD-10-CM

## 2022-08-14 PROCEDURE — 99285 EMERGENCY DEPT VISIT HI MDM: CPT | Mod: 25

## 2022-08-14 PROCEDURE — 93010 ELECTROCARDIOGRAM REPORT: CPT

## 2022-08-14 NOTE — ED ADULT TRIAGE NOTE - CHIEF COMPLAINT QUOTE
COVID+/ CHEST PAIN    Pt tested positive for covid on Saturday.  has pain from right hand rad to chest and back.  c/o general malaise, cough with clear secretions, ha, eye pain.  pmhx htn, diabetes type 2 - insulin 1x week.

## 2022-08-15 VITALS — HEART RATE: 91 BPM | RESPIRATION RATE: 20 BRPM | OXYGEN SATURATION: 98 %

## 2022-08-15 LAB
ALBUMIN SERPL ELPH-MCNC: 3.8 G/DL — SIGNIFICANT CHANGE UP (ref 3.3–5)
ALP SERPL-CCNC: 81 U/L — SIGNIFICANT CHANGE UP (ref 40–120)
ALT FLD-CCNC: 13 U/L — SIGNIFICANT CHANGE UP (ref 4–33)
ANION GAP SERPL CALC-SCNC: 11 MMOL/L — SIGNIFICANT CHANGE UP (ref 7–14)
AST SERPL-CCNC: 15 U/L — SIGNIFICANT CHANGE UP (ref 4–32)
BASOPHILS # BLD AUTO: 0.03 K/UL — SIGNIFICANT CHANGE UP (ref 0–0.2)
BASOPHILS NFR BLD AUTO: 0.6 % — SIGNIFICANT CHANGE UP (ref 0–2)
BILIRUB SERPL-MCNC: 0.4 MG/DL — SIGNIFICANT CHANGE UP (ref 0.2–1.2)
BUN SERPL-MCNC: 9 MG/DL — SIGNIFICANT CHANGE UP (ref 7–23)
CALCIUM SERPL-MCNC: 9.1 MG/DL — SIGNIFICANT CHANGE UP (ref 8.4–10.5)
CHLORIDE SERPL-SCNC: 103 MMOL/L — SIGNIFICANT CHANGE UP (ref 98–107)
CO2 SERPL-SCNC: 22 MMOL/L — SIGNIFICANT CHANGE UP (ref 22–31)
CREAT SERPL-MCNC: 0.77 MG/DL — SIGNIFICANT CHANGE UP (ref 0.5–1.3)
EGFR: 83 ML/MIN/1.73M2 — SIGNIFICANT CHANGE UP
EOSINOPHIL # BLD AUTO: 0.19 K/UL — SIGNIFICANT CHANGE UP (ref 0–0.5)
EOSINOPHIL NFR BLD AUTO: 4 % — SIGNIFICANT CHANGE UP (ref 0–6)
GLUCOSE SERPL-MCNC: 153 MG/DL — HIGH (ref 70–99)
HCT VFR BLD CALC: 40.6 % — SIGNIFICANT CHANGE UP (ref 34.5–45)
HGB BLD-MCNC: 13.2 G/DL — SIGNIFICANT CHANGE UP (ref 11.5–15.5)
IANC: 1.9 K/UL — SIGNIFICANT CHANGE UP (ref 1.8–7.4)
IMM GRANULOCYTES NFR BLD AUTO: 1.5 % — SIGNIFICANT CHANGE UP (ref 0–1.5)
LYMPHOCYTES # BLD AUTO: 1.86 K/UL — SIGNIFICANT CHANGE UP (ref 1–3.3)
LYMPHOCYTES # BLD AUTO: 39.6 % — SIGNIFICANT CHANGE UP (ref 13–44)
MCHC RBC-ENTMCNC: 26.7 PG — LOW (ref 27–34)
MCHC RBC-ENTMCNC: 32.5 GM/DL — SIGNIFICANT CHANGE UP (ref 32–36)
MCV RBC AUTO: 82.2 FL — SIGNIFICANT CHANGE UP (ref 80–100)
MONOCYTES # BLD AUTO: 0.65 K/UL — SIGNIFICANT CHANGE UP (ref 0–0.9)
MONOCYTES NFR BLD AUTO: 13.8 % — SIGNIFICANT CHANGE UP (ref 2–14)
NEUTROPHILS # BLD AUTO: 1.9 K/UL — SIGNIFICANT CHANGE UP (ref 1.8–7.4)
NEUTROPHILS NFR BLD AUTO: 40.5 % — LOW (ref 43–77)
NRBC # BLD: 0 /100 WBCS — SIGNIFICANT CHANGE UP
NRBC # FLD: 0 K/UL — SIGNIFICANT CHANGE UP
PLATELET # BLD AUTO: 205 K/UL — SIGNIFICANT CHANGE UP (ref 150–400)
POTASSIUM SERPL-MCNC: 4.1 MMOL/L — SIGNIFICANT CHANGE UP (ref 3.5–5.3)
POTASSIUM SERPL-SCNC: 4.1 MMOL/L — SIGNIFICANT CHANGE UP (ref 3.5–5.3)
PROT SERPL-MCNC: 6.7 G/DL — SIGNIFICANT CHANGE UP (ref 6–8.3)
RBC # BLD: 4.94 M/UL — SIGNIFICANT CHANGE UP (ref 3.8–5.2)
RBC # FLD: 13.6 % — SIGNIFICANT CHANGE UP (ref 10.3–14.5)
SODIUM SERPL-SCNC: 136 MMOL/L — SIGNIFICANT CHANGE UP (ref 135–145)
TROPONIN T, HIGH SENSITIVITY RESULT: 6 NG/L — SIGNIFICANT CHANGE UP
TROPONIN T, HIGH SENSITIVITY RESULT: 7 NG/L — SIGNIFICANT CHANGE UP
WBC # BLD: 4.7 K/UL — SIGNIFICANT CHANGE UP (ref 3.8–10.5)
WBC # FLD AUTO: 4.7 K/UL — SIGNIFICANT CHANGE UP (ref 3.8–10.5)

## 2022-08-15 PROCEDURE — 71045 X-RAY EXAM CHEST 1 VIEW: CPT | Mod: 26

## 2022-08-15 RX ORDER — SODIUM CHLORIDE 9 MG/ML
1000 INJECTION INTRAMUSCULAR; INTRAVENOUS; SUBCUTANEOUS ONCE
Refills: 0 | Status: COMPLETED | OUTPATIENT
Start: 2022-08-15 | End: 2022-08-15

## 2022-08-15 RX ORDER — IBUPROFEN 200 MG
600 TABLET ORAL ONCE
Refills: 0 | Status: COMPLETED | OUTPATIENT
Start: 2022-08-15 | End: 2022-08-15

## 2022-08-15 RX ADMIN — Medication 600 MILLIGRAM(S): at 04:03

## 2022-08-15 RX ADMIN — SODIUM CHLORIDE 1000 MILLILITER(S): 9 INJECTION INTRAMUSCULAR; INTRAVENOUS; SUBCUTANEOUS at 04:03

## 2022-08-15 NOTE — ED PROVIDER NOTE - PATIENT PORTAL LINK FT
You can access the FollowMyHealth Patient Portal offered by [facility] by registering at the following website: [email or web address]. By joining [facility]’s FollowMyHealth portal, you will also be able to view your health information using other applications (apps) compatible with our system.

## 2022-08-15 NOTE — ED ADULT NURSE NOTE - OBJECTIVE STATEMENT
received pt in room intake 12 c/o pain to right arm to chest to back.  tested positive for covid on Saturday.  speaking freely in full sentences .  20 gauge inserted left ac. blood sent as ordered. given meds. for re-eval

## 2022-08-15 NOTE — ED PROVIDER NOTE - NSFOLLOWUPINSTRUCTIONS_ED_ALL_ED_FT
Thank you for visiting our Emergency Department, it has been a pleasure taking part in your healthcare.    Your discharge diagnosis is: chest discomfort  Please take all discharge medications as indicated below:  Take Motrin/Tylenol for pain as needed, please follow instructions on manufacturers label. If you have any questions please consult a pharmacist or your PMD.  Please follow up with your PMD within x48 hours.  A copy of resulted labs, imaging, and findings have been provided to you.   You have had a detailed discussion with your provider regarding your diagnosis, care management and discharge planning including, but not limited to: return precautions, follow up visits with existing or new providers, new prescriptions and/or medication changes, wound and/or splint/cast care or other care   aspects specific to your diagnosis and treatment. You have been given the opportunity to have your questions answered. At this time you have been deemed stable and fit for discharge.  Return precautions to the Emergency Department include but are not limited to: unrelenting nausea, vomiting, fever, chills, chest pain, shortness of breath, dizziness, chest or abdominal pain, worsening back pain, syncope, blood in urine or stool, headache that doesn't resolve, numbness or tingling, loss of sensation, loss of motor function, or any other concerning symptoms.

## 2022-08-15 NOTE — ED PROVIDER NOTE - OBJECTIVE STATEMENT
Junior NAVARRETE: 67F hx DM HLD HTN presents with a cc of R sided arm pain a/w R sided chest discomfort over last x2-3 days, recently tested positive for covid, starred paxlovid however made her feel unwell notes intermittent non exertional RUE pain notes small nodule to AC fossa area, no prior dvt or pe, mild low grade fevers no diffuse UE swelling. Denies n/v//cp/sob. Denies headache, syncope, lightheadedness, dizziness. Denies chest palpitations, abdominal pain. Denies edema. Denies dysuria, hematuria, BRBPR, tarry stools, diarrhea, constipation.

## 2022-08-15 NOTE — ED PROVIDER NOTE - CLINICAL SUMMARY MEDICAL DECISION MAKING FREE TEXT BOX
Junior NAVARRETE: 67F hx DM HLD HTN presents with a cc of R sided arm pain a/w R sided chest discomfort over last x2-3 days, recently tested positive for covid, [facility] however made her feel unwell notes intermittent non exertional RUE pain notes small nodule to AC fossa area, no prior dvt or pe, mild low grade fevers no diffuse UE swelling. exam vss non toxic PE as above ddx c/f likely viral syndrome, low c/f acs, unclear etiology of nodule in AC consider possible neuroma or lymph node, not c/w abscess or dvt plan to check labs ekg cxr cardiacs meds as needed, reassess. ekg reassuring.

## 2022-08-15 NOTE — ED PROVIDER NOTE - PHYSICAL EXAMINATION
Junior NAVARRETE:  VITALS: Initial triage and subsequent vitals have been reviewed by me.  GEN APPEARANCE: WDWN, alert and cooperative, non-toxic appearing and in NAD  HEAD: Atraumatic, normocephalic   EYES: PERRLa, EOMI, vision grossly intact.   EARS: Gross hearing intact.   NOSE: No nasal discharge, no external evidence of epistaxis.   NECK: Supple  CV: RRR, S1S2, no c/r/m/g. No cyanosis. Extremities warm, well perfused. Cap refill <2 seconds. No bruits.   LUNGS: CTAB. No wheezing. No rales. No rhonchi. No diminished breath sounds.   ABDOMEN: Soft, NTND. No guarding or rebound. No masses.   MSK/EXT: Spine appears normal, no spine point tenderness. No CVA ttp. Normal muscular development. Pelvis stable. No obvious joint or bony deformity, no peripheral edema. R ac fossa w small non erythematous 2x2 cm mildly tender nodule   NEURO: Alert, follows commands. Weight bearing normal. Speech normal. Sensation and motor normal x4 extremities.   SKIN: Normal color for race, warm, dry and intact. No evidence of rash.  PSYCH: Normal mood and affect.

## 2023-02-21 ENCOUNTER — EMERGENCY (EMERGENCY)
Facility: HOSPITAL | Age: 68
LOS: 0 days | Discharge: HOME | End: 2023-02-22
Attending: EMERGENCY MEDICINE
Payer: MEDICARE

## 2023-02-21 VITALS
SYSTOLIC BLOOD PRESSURE: 124 MMHG | HEART RATE: 98 BPM | RESPIRATION RATE: 18 BRPM | DIASTOLIC BLOOD PRESSURE: 70 MMHG | OXYGEN SATURATION: 96 % | TEMPERATURE: 100 F

## 2023-02-21 VITALS
DIASTOLIC BLOOD PRESSURE: 84 MMHG | WEIGHT: 240.08 LBS | TEMPERATURE: 99 F | HEIGHT: 68 IN | RESPIRATION RATE: 14 BRPM | HEART RATE: 114 BPM | SYSTOLIC BLOOD PRESSURE: 130 MMHG | OXYGEN SATURATION: 96 %

## 2023-02-21 DIAGNOSIS — Z79.85 LONG-TERM (CURRENT) USE OF INJECTABLE NON-INSULIN ANTIDIABETIC DRUGS: ICD-10-CM

## 2023-02-21 DIAGNOSIS — Z88.5 ALLERGY STATUS TO NARCOTIC AGENT: ICD-10-CM

## 2023-02-21 DIAGNOSIS — R10.9 UNSPECIFIED ABDOMINAL PAIN: ICD-10-CM

## 2023-02-21 DIAGNOSIS — Z90.710 ACQUIRED ABSENCE OF BOTH CERVIX AND UTERUS: Chronic | ICD-10-CM

## 2023-02-21 DIAGNOSIS — R11.2 NAUSEA WITH VOMITING, UNSPECIFIED: ICD-10-CM

## 2023-02-21 DIAGNOSIS — Z20.822 CONTACT WITH AND (SUSPECTED) EXPOSURE TO COVID-19: ICD-10-CM

## 2023-02-21 DIAGNOSIS — Z79.4 LONG TERM (CURRENT) USE OF INSULIN: ICD-10-CM

## 2023-02-21 DIAGNOSIS — Z91.018 ALLERGY TO OTHER FOODS: ICD-10-CM

## 2023-02-21 DIAGNOSIS — R19.7 DIARRHEA, UNSPECIFIED: ICD-10-CM

## 2023-02-21 DIAGNOSIS — E11.9 TYPE 2 DIABETES MELLITUS WITHOUT COMPLICATIONS: ICD-10-CM

## 2023-02-21 DIAGNOSIS — B34.9 VIRAL INFECTION, UNSPECIFIED: ICD-10-CM

## 2023-02-21 DIAGNOSIS — Z90.710 ACQUIRED ABSENCE OF BOTH CERVIX AND UTERUS: ICD-10-CM

## 2023-02-21 DIAGNOSIS — I10 ESSENTIAL (PRIMARY) HYPERTENSION: ICD-10-CM

## 2023-02-21 LAB
ALBUMIN SERPL ELPH-MCNC: 3.5 G/DL — SIGNIFICANT CHANGE UP (ref 3.3–5)
ALP SERPL-CCNC: 92 U/L — SIGNIFICANT CHANGE UP (ref 40–120)
ALT FLD-CCNC: 23 U/L — SIGNIFICANT CHANGE UP (ref 12–78)
ANION GAP SERPL CALC-SCNC: 5 MMOL/L — SIGNIFICANT CHANGE UP (ref 5–17)
APPEARANCE UR: CLEAR — SIGNIFICANT CHANGE UP
AST SERPL-CCNC: 18 U/L — SIGNIFICANT CHANGE UP (ref 15–37)
BACTERIA # UR AUTO: ABNORMAL
BASOPHILS # BLD AUTO: 0.01 K/UL — SIGNIFICANT CHANGE UP (ref 0–0.2)
BASOPHILS NFR BLD AUTO: 0.1 % — SIGNIFICANT CHANGE UP (ref 0–2)
BILIRUB SERPL-MCNC: 0.8 MG/DL — SIGNIFICANT CHANGE UP (ref 0.2–1.2)
BILIRUB UR-MCNC: NEGATIVE — SIGNIFICANT CHANGE UP
BUN SERPL-MCNC: 11 MG/DL — SIGNIFICANT CHANGE UP (ref 7–23)
CALCIUM SERPL-MCNC: 8.9 MG/DL — SIGNIFICANT CHANGE UP (ref 8.5–10.1)
CHLORIDE SERPL-SCNC: 107 MMOL/L — SIGNIFICANT CHANGE UP (ref 96–108)
CO2 SERPL-SCNC: 29 MMOL/L — SIGNIFICANT CHANGE UP (ref 22–31)
COLOR SPEC: YELLOW — SIGNIFICANT CHANGE UP
CREAT SERPL-MCNC: 0.8 MG/DL — SIGNIFICANT CHANGE UP (ref 0.5–1.3)
DIFF PNL FLD: NEGATIVE — SIGNIFICANT CHANGE UP
EGFR: 80 ML/MIN/1.73M2 — SIGNIFICANT CHANGE UP
EOSINOPHIL # BLD AUTO: 0.07 K/UL — SIGNIFICANT CHANGE UP (ref 0–0.5)
EOSINOPHIL NFR BLD AUTO: 1 % — SIGNIFICANT CHANGE UP (ref 0–6)
EPI CELLS # UR: SIGNIFICANT CHANGE UP
GLUCOSE SERPL-MCNC: 135 MG/DL — HIGH (ref 70–99)
GLUCOSE UR QL: NEGATIVE MG/DL — SIGNIFICANT CHANGE UP
HCT VFR BLD CALC: 42 % — SIGNIFICANT CHANGE UP (ref 34.5–45)
HGB BLD-MCNC: 13.6 G/DL — SIGNIFICANT CHANGE UP (ref 11.5–15.5)
HMPV RNA SPEC QL NAA+PROBE: DETECTED
IMM GRANULOCYTES NFR BLD AUTO: 0.7 % — SIGNIFICANT CHANGE UP (ref 0–0.9)
KETONES UR-MCNC: NEGATIVE — SIGNIFICANT CHANGE UP
LACTATE SERPL-SCNC: 0.9 MMOL/L — SIGNIFICANT CHANGE UP (ref 0.7–2)
LEUKOCYTE ESTERASE UR-ACNC: NEGATIVE — SIGNIFICANT CHANGE UP
LIDOCAIN IGE QN: 89 U/L — SIGNIFICANT CHANGE UP (ref 73–393)
LYMPHOCYTES # BLD AUTO: 0.75 K/UL — LOW (ref 1–3.3)
LYMPHOCYTES # BLD AUTO: 10.9 % — LOW (ref 13–44)
MCHC RBC-ENTMCNC: 26.4 PG — LOW (ref 27–34)
MCHC RBC-ENTMCNC: 32.4 G/DL — SIGNIFICANT CHANGE UP (ref 32–36)
MCV RBC AUTO: 81.4 FL — SIGNIFICANT CHANGE UP (ref 80–100)
MONOCYTES # BLD AUTO: 0.47 K/UL — SIGNIFICANT CHANGE UP (ref 0–0.9)
MONOCYTES NFR BLD AUTO: 6.8 % — SIGNIFICANT CHANGE UP (ref 2–14)
NEUTROPHILS # BLD AUTO: 5.52 K/UL — SIGNIFICANT CHANGE UP (ref 1.8–7.4)
NEUTROPHILS NFR BLD AUTO: 80.5 % — HIGH (ref 43–77)
NITRITE UR-MCNC: NEGATIVE — SIGNIFICANT CHANGE UP
NRBC # BLD: 0 /100 WBCS — SIGNIFICANT CHANGE UP (ref 0–0)
PH UR: 5 — SIGNIFICANT CHANGE UP (ref 5–8)
PLATELET # BLD AUTO: 209 K/UL — SIGNIFICANT CHANGE UP (ref 150–400)
POTASSIUM SERPL-MCNC: 4 MMOL/L — SIGNIFICANT CHANGE UP (ref 3.5–5.3)
POTASSIUM SERPL-SCNC: 4 MMOL/L — SIGNIFICANT CHANGE UP (ref 3.5–5.3)
PROT SERPL-MCNC: 6.9 GM/DL — SIGNIFICANT CHANGE UP (ref 6–8.3)
PROT UR-MCNC: 15 MG/DL
RAPID RVP RESULT: DETECTED
RBC # BLD: 5.16 M/UL — SIGNIFICANT CHANGE UP (ref 3.8–5.2)
RBC # FLD: 13.6 % — SIGNIFICANT CHANGE UP (ref 10.3–14.5)
RBC CASTS # UR COMP ASSIST: SIGNIFICANT CHANGE UP /HPF (ref 0–4)
RV+EV RNA SPEC QL NAA+PROBE: DETECTED
SARS-COV-2 RNA SPEC QL NAA+PROBE: SIGNIFICANT CHANGE UP
SODIUM SERPL-SCNC: 141 MMOL/L — SIGNIFICANT CHANGE UP (ref 135–145)
SP GR SPEC: 1.01 — SIGNIFICANT CHANGE UP (ref 1.01–1.02)
UROBILINOGEN FLD QL: NEGATIVE MG/DL — SIGNIFICANT CHANGE UP
WBC # BLD: 6.87 K/UL — SIGNIFICANT CHANGE UP (ref 3.8–10.5)
WBC # FLD AUTO: 6.87 K/UL — SIGNIFICANT CHANGE UP (ref 3.8–10.5)
WBC UR QL: SIGNIFICANT CHANGE UP

## 2023-02-21 PROCEDURE — 71045 X-RAY EXAM CHEST 1 VIEW: CPT | Mod: 26

## 2023-02-21 PROCEDURE — 76700 US EXAM ABDOM COMPLETE: CPT | Mod: 26

## 2023-02-21 PROCEDURE — 99284 EMERGENCY DEPT VISIT MOD MDM: CPT

## 2023-02-21 PROCEDURE — 74177 CT ABD & PELVIS W/CONTRAST: CPT | Mod: 26,MA

## 2023-02-21 RX ORDER — PANTOPRAZOLE SODIUM 20 MG/1
40 TABLET, DELAYED RELEASE ORAL ONCE
Refills: 0 | Status: COMPLETED | OUTPATIENT
Start: 2023-02-21 | End: 2023-02-21

## 2023-02-21 RX ORDER — ACETAMINOPHEN 500 MG
975 TABLET ORAL ONCE
Refills: 0 | Status: COMPLETED | OUTPATIENT
Start: 2023-02-21 | End: 2023-02-21

## 2023-02-21 RX ORDER — ONDANSETRON 8 MG/1
4 TABLET, FILM COATED ORAL ONCE
Refills: 0 | Status: COMPLETED | OUTPATIENT
Start: 2023-02-21 | End: 2023-02-21

## 2023-02-21 RX ORDER — SODIUM CHLORIDE 9 MG/ML
1000 INJECTION INTRAMUSCULAR; INTRAVENOUS; SUBCUTANEOUS ONCE
Refills: 0 | Status: COMPLETED | OUTPATIENT
Start: 2023-02-21 | End: 2023-02-21

## 2023-02-21 RX ADMIN — Medication 975 MILLIGRAM(S): at 22:01

## 2023-02-21 RX ADMIN — SODIUM CHLORIDE 1000 MILLILITER(S): 9 INJECTION INTRAMUSCULAR; INTRAVENOUS; SUBCUTANEOUS at 17:23

## 2023-02-21 RX ADMIN — ONDANSETRON 4 MILLIGRAM(S): 8 TABLET, FILM COATED ORAL at 18:10

## 2023-02-21 RX ADMIN — PANTOPRAZOLE SODIUM 40 MILLIGRAM(S): 20 TABLET, DELAYED RELEASE ORAL at 18:10

## 2023-02-21 RX ADMIN — Medication 975 MILLIGRAM(S): at 23:38

## 2023-02-21 NOTE — ED ADULT NURSE NOTE - NSIMPLEMENTINTERV_GEN_ALL_ED
Implemented All Universal Safety Interventions:  [place] to call system. Call bell, personal items and telephone within reach. Instruct patient to call for assistance. Room bathroom lighting operational. Non-slip footwear when patient is off stretcher. Physically safe environment: no spills, clutter or unnecessary equipment. Stretcher in lowest position, wheels locked, appropriate side rails in place.

## 2023-02-21 NOTE — ED PROVIDER NOTE - PROGRESS NOTE DETAILS
Patient care endorsed by Dr Marvin, patient febrile with sx as described in HPI.  VSS.  Lab values reviewed, there are no values which require acute intervention.  UA neg.  CT and US negative for acute findings, patient aware of right renal cyst.  Patient's RVP + for multiple viruses, patient advised bed rest, supportive care, fluids, quarantine, and follow up with her PMD. Tolerating PO, will dc with zofran. Discussed results and outcome of today's visit with the patient/family, copy of results given with discharge.  Patient advised to arrange moran follow up with their PMD and/or any provided referral(s) within the next few days and are cautioned to return to the Emergency Department for any worsening symptoms.  Patient advised that their doctor may call  to follow up on the specific results of the tests performed today in the emergency department.   Patient appears well on discharge.

## 2023-02-21 NOTE — ED PROVIDER NOTE - CLINICAL SUMMARY MEDICAL DECISION MAKING FREE TEXT BOX
pt presents today c/o abdominal pain and vomiting Pt with PMHx of HTN, DM and obesity presents with abdominal pain a/w nausea, vomiting, diarrhea. (+) history of hysterectomy. No hx of trauma. No hematemesis or hematochezia/melena. Pt is hemodynamically stable, mentating well. Exam and history is concerning for appendicitis, cholecystitis, diverticulitis, gastroenteritis, PUD/gastritis. Will require diagnostic imaging.  Considered DDx but unlikely due to the clinical presentation and exam:   Cardiac: ACS, AAA rupture, dissection  Pulm: Lower lobe pneumonia  GI: SBO, Inflammatory bowel disease, Irritable Bowel Syndrome, viscous perforation,gastroenteritis, gastritis/PUD/perforated ulcer  Metabolic: DKA  Renal: Urolithiasis, UTI/cystitis/pyelonephritis.  ID: Shingles, cellulitis.  : Testicular torsion  OB/GYN: ovarian torsion, GYN pathology PID/TOA, fibroids, ruptured ectopic pregnancy  PLAN:  - Reviewed patient's prior medical record.   - IV fluids, parenteral analgesia & anti-emetics PRN  - EKG, CBC, CMP, lipase, UA/UCx, lactate, B-HCG  - CT abd/pelvis, ultrasound   -Observation and reassessment, surgical consultation if necessary.

## 2023-02-21 NOTE — ED ADULT TRIAGE NOTE - CHIEF COMPLAINT QUOTE
Came in for abdominal pain, n/v and diarrhea started last night. Denies fever or chills, suspects it's food poisoning. Says she has not eaten and unable to keep anything down since last night. PMH DM, HTN.

## 2023-02-21 NOTE — ED PROVIDER NOTE - OBJECTIVE STATEMENT
67 year old female with h/o HTN, DM and obesity presents today c/o abdominal pain, vomiting and diarrhea, pt 67 year old female with h/o HTN, DM and obesity presents today c/o abdominal pain, vomiting and diarrhea, pt reports having jumbalaya yesterday at a restaurant, states that it tasted fine, however once pt got home her symptoms started and lasted throughout the night, (-) fevers or chills

## 2023-02-21 NOTE — ED PROVIDER NOTE - PATIENT PORTAL LINK FT
You can access the FollowMyHealth Patient Portal offered by [facility] by registering at the following website: [email or web address]. By joining [facility]’s FollowMyHealth portal, you will also be able to view your health information using other applications (apps) compatible with our system.

## 2023-02-22 LAB
CULTURE RESULTS: SIGNIFICANT CHANGE UP
SPECIMEN SOURCE: SIGNIFICANT CHANGE UP

## 2023-02-22 RX ORDER — ONDANSETRON 8 MG/1
1 TABLET, FILM COATED ORAL
Qty: 15 | Refills: 0
Start: 2023-02-22 | End: 2023-02-26

## 2023-12-19 NOTE — ED PROVIDER NOTE - EKG #1 DATE/TIME
[Fall prevention counseling provided] : Fall prevention counseling provided [Adequate lighting] : Adequate lighting [No throw rugs] : No throw rugs [Use proper foot wear] : Use proper foot wear [Behavioral health counseling provided] : Behavioral health counseling provided [Sleep ___ hours/day] : Sleep [unfilled] hours/day [Engage in a relaxing activity] : Engage in a relaxing activity [Plan in advance] : Plan in advance [AUDIT-C Screening administered and reviewed] : AUDIT-C Screening administered and reviewed [Potential consequences of obesity discussed] : Potential consequences of obesity discussed [Benefits of weight loss discussed] : Benefits of weight loss discussed [Encouraged to increase physical activity] : Encouraged to increase physical activity [Weigh Self Weekly] : weigh self weekly [Decrease Portions] : decrease portions [None] : None [Good understanding] : Patient has a good understanding of lifestyle changes and steps needed to achieve self management goal [FreeTextEntry2] : never smoked  25-Aug-2017 18:43

## 2024-03-20 NOTE — CONSULT NOTE ADULT - ATTENDING COMMENTS
There are no Wet Read(s) to document.
discussed with patient in detail, all questions answered
Patient seen, agree with Dr. Rich plan above. 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  Discussed importance of behavioral modifications and risk of uncontrolled diabetes.  Discharge on basal/bolus insulin, requires endocrinology outpatient.  [address] [place] [address], [address].  [phone]

## 2024-06-04 ENCOUNTER — EMERGENCY (EMERGENCY)
Facility: HOSPITAL | Age: 69
LOS: 0 days | Discharge: HOME | End: 2024-06-04
Attending: EMERGENCY MEDICINE
Payer: MEDICARE

## 2024-06-04 VITALS
HEART RATE: 85 BPM | WEIGHT: 250 LBS | DIASTOLIC BLOOD PRESSURE: 91 MMHG | SYSTOLIC BLOOD PRESSURE: 155 MMHG | OXYGEN SATURATION: 99 % | RESPIRATION RATE: 18 BRPM | TEMPERATURE: 100 F | HEIGHT: 68 IN

## 2024-06-04 VITALS
DIASTOLIC BLOOD PRESSURE: 82 MMHG | OXYGEN SATURATION: 99 % | SYSTOLIC BLOOD PRESSURE: 150 MMHG | RESPIRATION RATE: 18 BRPM | HEART RATE: 80 BPM

## 2024-06-04 DIAGNOSIS — M54.42 LUMBAGO WITH SCIATICA, LEFT SIDE: ICD-10-CM

## 2024-06-04 DIAGNOSIS — I10 ESSENTIAL (PRIMARY) HYPERTENSION: ICD-10-CM

## 2024-06-04 DIAGNOSIS — M54.41 LUMBAGO WITH SCIATICA, RIGHT SIDE: ICD-10-CM

## 2024-06-04 DIAGNOSIS — E11.9 TYPE 2 DIABETES MELLITUS WITHOUT COMPLICATIONS: ICD-10-CM

## 2024-06-04 DIAGNOSIS — Z88.8 ALLERGY STATUS TO OTHER DRUGS, MEDICAMENTS AND BIOLOGICAL SUBSTANCES: ICD-10-CM

## 2024-06-04 DIAGNOSIS — M54.9 DORSALGIA, UNSPECIFIED: ICD-10-CM

## 2024-06-04 DIAGNOSIS — Z90.710 ACQUIRED ABSENCE OF BOTH CERVIX AND UTERUS: Chronic | ICD-10-CM

## 2024-06-04 LAB
ALBUMIN SERPL ELPH-MCNC: 3.5 G/DL — SIGNIFICANT CHANGE UP (ref 3.3–5)
ALP SERPL-CCNC: 81 U/L — SIGNIFICANT CHANGE UP (ref 40–120)
ALT FLD-CCNC: 19 U/L — SIGNIFICANT CHANGE UP (ref 12–78)
ANION GAP SERPL CALC-SCNC: 4 MMOL/L — LOW (ref 5–17)
AST SERPL-CCNC: 15 U/L — SIGNIFICANT CHANGE UP (ref 15–37)
BASOPHILS # BLD AUTO: 0.04 K/UL — SIGNIFICANT CHANGE UP (ref 0–0.2)
BASOPHILS NFR BLD AUTO: 0.6 % — SIGNIFICANT CHANGE UP (ref 0–2)
BILIRUB SERPL-MCNC: 0.5 MG/DL — SIGNIFICANT CHANGE UP (ref 0.2–1.2)
BUN SERPL-MCNC: 19 MG/DL — SIGNIFICANT CHANGE UP (ref 7–23)
CALCIUM SERPL-MCNC: 8.9 MG/DL — SIGNIFICANT CHANGE UP (ref 8.5–10.1)
CHLORIDE SERPL-SCNC: 110 MMOL/L — HIGH (ref 96–108)
CO2 SERPL-SCNC: 27 MMOL/L — SIGNIFICANT CHANGE UP (ref 22–31)
CREAT SERPL-MCNC: 1.04 MG/DL — SIGNIFICANT CHANGE UP (ref 0.5–1.3)
EGFR: 58 ML/MIN/1.73M2 — LOW
EOSINOPHIL # BLD AUTO: 0.26 K/UL — SIGNIFICANT CHANGE UP (ref 0–0.5)
EOSINOPHIL NFR BLD AUTO: 3.8 % — SIGNIFICANT CHANGE UP (ref 0–6)
GLUCOSE SERPL-MCNC: 159 MG/DL — HIGH (ref 70–99)
HCT VFR BLD CALC: 39 % — SIGNIFICANT CHANGE UP (ref 34.5–45)
HGB BLD-MCNC: 12.6 G/DL — SIGNIFICANT CHANGE UP (ref 11.5–15.5)
IMM GRANULOCYTES NFR BLD AUTO: 0.7 % — SIGNIFICANT CHANGE UP (ref 0–0.9)
LYMPHOCYTES # BLD AUTO: 2.48 K/UL — SIGNIFICANT CHANGE UP (ref 1–3.3)
LYMPHOCYTES # BLD AUTO: 36.6 % — SIGNIFICANT CHANGE UP (ref 13–44)
MCHC RBC-ENTMCNC: 26.4 PG — LOW (ref 27–34)
MCHC RBC-ENTMCNC: 32.3 G/DL — SIGNIFICANT CHANGE UP (ref 32–36)
MCV RBC AUTO: 81.8 FL — SIGNIFICANT CHANGE UP (ref 80–100)
MONOCYTES # BLD AUTO: 0.65 K/UL — SIGNIFICANT CHANGE UP (ref 0–0.9)
MONOCYTES NFR BLD AUTO: 9.6 % — SIGNIFICANT CHANGE UP (ref 2–14)
NEUTROPHILS # BLD AUTO: 3.29 K/UL — SIGNIFICANT CHANGE UP (ref 1.8–7.4)
NEUTROPHILS NFR BLD AUTO: 48.7 % — SIGNIFICANT CHANGE UP (ref 43–77)
NRBC # BLD: 0 /100 WBCS — SIGNIFICANT CHANGE UP (ref 0–0)
PLATELET # BLD AUTO: 218 K/UL — SIGNIFICANT CHANGE UP (ref 150–400)
POTASSIUM SERPL-MCNC: 3.8 MMOL/L — SIGNIFICANT CHANGE UP (ref 3.5–5.3)
POTASSIUM SERPL-SCNC: 3.8 MMOL/L — SIGNIFICANT CHANGE UP (ref 3.5–5.3)
PROT SERPL-MCNC: 6.7 GM/DL — SIGNIFICANT CHANGE UP (ref 6–8.3)
RBC # BLD: 4.77 M/UL — SIGNIFICANT CHANGE UP (ref 3.8–5.2)
RBC # FLD: 13.6 % — SIGNIFICANT CHANGE UP (ref 10.3–14.5)
SODIUM SERPL-SCNC: 141 MMOL/L — SIGNIFICANT CHANGE UP (ref 135–145)
WBC # BLD: 6.77 K/UL — SIGNIFICANT CHANGE UP (ref 3.8–10.5)
WBC # FLD AUTO: 6.77 K/UL — SIGNIFICANT CHANGE UP (ref 3.8–10.5)

## 2024-06-04 PROCEDURE — 72131 CT LUMBAR SPINE W/O DYE: CPT | Mod: 26,MC

## 2024-06-04 PROCEDURE — 99284 EMERGENCY DEPT VISIT MOD MDM: CPT

## 2024-06-04 RX ORDER — KETOROLAC TROMETHAMINE 30 MG/ML
15 SYRINGE (ML) INJECTION ONCE
Refills: 0 | Status: DISCONTINUED | OUTPATIENT
Start: 2024-06-04 | End: 2024-06-04

## 2024-06-04 RX ORDER — METHOCARBAMOL 500 MG/1
1500 TABLET, FILM COATED ORAL ONCE
Refills: 0 | Status: COMPLETED | OUTPATIENT
Start: 2024-06-04 | End: 2024-06-04

## 2024-06-04 RX ORDER — METHOCARBAMOL 500 MG/1
2 TABLET, FILM COATED ORAL
Qty: 30 | Refills: 0
Start: 2024-06-04 | End: 2024-06-08

## 2024-06-04 RX ORDER — TRAMADOL HYDROCHLORIDE 50 MG/1
1 TABLET ORAL
Qty: 9 | Refills: 0
Start: 2024-06-04 | End: 2024-06-06

## 2024-06-04 RX ADMIN — METHOCARBAMOL 1500 MILLIGRAM(S): 500 TABLET, FILM COATED ORAL at 19:05

## 2024-06-04 RX ADMIN — Medication 15 MILLIGRAM(S): at 19:05

## 2024-06-04 NOTE — ED PROVIDER NOTE - CLINICAL SUMMARY MEDICAL DECISION MAKING FREE TEXT BOX
Patient with suspected sciatica otherwise well-appearing and offered CT as patient recently had an x-ray of the lumbar spine.  Will otherwise medicate with Robaxin and Toradol in ED. Patient with suspected sciatica otherwise well-appearing and offered CT as patient recently had an x-ray of the lumbar spine.  Will otherwise medicate with Robaxin and Toradol in ED.    Discussed results and outcome of testing with the patient.  Patient adivsed to please follow up with their primary care doctor within the next 24 hours and return to the Emergency Department for worsening symptoms or any other concerns.  Patient advised that their doctor may call  to follow up on the specific results of the tests performed today in the emergency department.  Discussed regarding need for follow up for R renal cyst.

## 2024-06-04 NOTE — ED ADULT NURSE REASSESSMENT NOTE - NS ED NURSE REASSESS COMMENT FT1
Pt received from Day RN. Pt Aox3 and reports no acute distress at this time. Pt awaiting to go to CT scan.

## 2024-06-04 NOTE — ED ADULT NURSE NOTE - NSFALLHARMRISKINTERV_ED_ALL_ED

## 2024-06-04 NOTE — ED ADULT NURSE NOTE - OBJECTIVE STATEMENT
Pt complains of back pain since last week; pt reports mechanical fall from chair last month; no apparent distress; daughter at bedside.

## 2024-06-04 NOTE — ED PROVIDER NOTE - OBJECTIVE STATEMENT
68-year-old female with history of diabetes and hypertension presenting to ER due to lower back pain rating down bilateral legs for the past week.  Patient states that she had seen her PMD before the weekend and was prescribed naproxen and Flexeril but seems to not have improved.  Patient had a fall about 1 month ago and states that she was fine and been able to walk but may have walked a little too much recently about a week ago when pain started.  Pain is worse on standing and walking and otherwise denies any loss of bowel or urinary control.

## 2024-06-04 NOTE — ED PROVIDER NOTE - PATIENT PORTAL LINK FT
You can access the FollowMyHealth Patient Portal offered by [facility] by registering at the following website: [email or web address]. By joining [facility]’s FollowMyHealth portal, you will also be able to view your health information using other applications (apps) compatible with our system.

## 2024-06-04 NOTE — ED PROVIDER NOTE - MUSCULOSKELETAL MINIMAL EXAM
no midline C/T/L spine tenderness, increased muscle tone on left paraspinal tissue, sensation intact to saddle region, distal motor and sensation intact otherwise.

## 2024-06-04 NOTE — ED PROVIDER NOTE - CARE PROVIDER_API CALL
Evelyn Whitehead  Orthopaedic Surgery  [facility], [address] [address]  Phone: [phone]  Fax: [phone]  Follow Up Time: 1-3 Days

## 2024-06-04 NOTE — ED ADULT TRIAGE NOTE - CHIEF COMPLAINT QUOTE
pt c/o  upper and lower back pain radiating to the abdomen for a week. pt also c/o cloudy urine as well. denies injury. pt was seen at pmd office was started on flexeril and naproxen with no relief. history of htn and dm.

## 2024-06-12 DIAGNOSIS — M54.9 DORSALGIA, UNSPECIFIED: ICD-10-CM

## 2024-06-17 ENCOUNTER — APPOINTMENT (OUTPATIENT)
Dept: ORTHOPEDIC SURGERY | Facility: CLINIC | Age: 69
End: 2024-06-17

## 2024-10-06 ENCOUNTER — EMERGENCY (EMERGENCY)
Facility: HOSPITAL | Age: 69
LOS: 1 days | Discharge: HOME | End: 2024-10-06
Attending: STUDENT IN AN ORGANIZED HEALTH CARE EDUCATION/TRAINING PROGRAM | Admitting: STUDENT IN AN ORGANIZED HEALTH CARE EDUCATION/TRAINING PROGRAM
Payer: MEDICARE

## 2024-10-06 VITALS
WEIGHT: 250 LBS | HEART RATE: 74 BPM | OXYGEN SATURATION: 97 % | DIASTOLIC BLOOD PRESSURE: 95 MMHG | RESPIRATION RATE: 18 BRPM | SYSTOLIC BLOOD PRESSURE: 169 MMHG | TEMPERATURE: 98 F

## 2024-10-06 VITALS
HEART RATE: 75 BPM | SYSTOLIC BLOOD PRESSURE: 152 MMHG | DIASTOLIC BLOOD PRESSURE: 78 MMHG | TEMPERATURE: 98 F | RESPIRATION RATE: 17 BRPM | OXYGEN SATURATION: 98 %

## 2024-10-06 DIAGNOSIS — Z90.710 ACQUIRED ABSENCE OF BOTH CERVIX AND UTERUS: Chronic | ICD-10-CM

## 2024-10-06 LAB
ALBUMIN SERPL ELPH-MCNC: 3.9 G/DL — SIGNIFICANT CHANGE UP (ref 3.3–5)
ALP SERPL-CCNC: 98 U/L — SIGNIFICANT CHANGE UP (ref 40–120)
ALT FLD-CCNC: 12 U/L — SIGNIFICANT CHANGE UP (ref 4–33)
ANION GAP SERPL CALC-SCNC: 11 MMOL/L — SIGNIFICANT CHANGE UP (ref 7–14)
AST SERPL-CCNC: 12 U/L — SIGNIFICANT CHANGE UP (ref 4–32)
BASOPHILS # BLD AUTO: 0.04 K/UL — SIGNIFICANT CHANGE UP (ref 0–0.2)
BASOPHILS NFR BLD AUTO: 0.7 % — SIGNIFICANT CHANGE UP (ref 0–2)
BILIRUB SERPL-MCNC: 0.6 MG/DL — SIGNIFICANT CHANGE UP (ref 0.2–1.2)
BUN SERPL-MCNC: 12 MG/DL — SIGNIFICANT CHANGE UP (ref 7–23)
CALCIUM SERPL-MCNC: 9.3 MG/DL — SIGNIFICANT CHANGE UP (ref 8.4–10.5)
CHLORIDE SERPL-SCNC: 104 MMOL/L — SIGNIFICANT CHANGE UP (ref 98–107)
CO2 SERPL-SCNC: 25 MMOL/L — SIGNIFICANT CHANGE UP (ref 22–31)
CREAT SERPL-MCNC: 0.73 MG/DL — SIGNIFICANT CHANGE UP (ref 0.5–1.3)
EGFR: 88 ML/MIN/1.73M2 — SIGNIFICANT CHANGE UP
EOSINOPHIL # BLD AUTO: 0.16 K/UL — SIGNIFICANT CHANGE UP (ref 0–0.5)
EOSINOPHIL NFR BLD AUTO: 2.9 % — SIGNIFICANT CHANGE UP (ref 0–6)
GLUCOSE SERPL-MCNC: 127 MG/DL — HIGH (ref 70–99)
HCT VFR BLD CALC: 40.3 % — SIGNIFICANT CHANGE UP (ref 34.5–45)
HGB BLD-MCNC: 12.9 G/DL — SIGNIFICANT CHANGE UP (ref 11.5–15.5)
IANC: 2.69 K/UL — SIGNIFICANT CHANGE UP (ref 1.8–7.4)
IMM GRANULOCYTES NFR BLD AUTO: 0.4 % — SIGNIFICANT CHANGE UP (ref 0–0.9)
LIDOCAIN IGE QN: 42 U/L — SIGNIFICANT CHANGE UP (ref 7–60)
LYMPHOCYTES # BLD AUTO: 2.07 K/UL — SIGNIFICANT CHANGE UP (ref 1–3.3)
LYMPHOCYTES # BLD AUTO: 37 % — SIGNIFICANT CHANGE UP (ref 13–44)
MCHC RBC-ENTMCNC: 26.1 PG — LOW (ref 27–34)
MCHC RBC-ENTMCNC: 32 GM/DL — SIGNIFICANT CHANGE UP (ref 32–36)
MCV RBC AUTO: 81.4 FL — SIGNIFICANT CHANGE UP (ref 80–100)
MONOCYTES # BLD AUTO: 0.61 K/UL — SIGNIFICANT CHANGE UP (ref 0–0.9)
MONOCYTES NFR BLD AUTO: 10.9 % — SIGNIFICANT CHANGE UP (ref 2–14)
NEUTROPHILS # BLD AUTO: 2.69 K/UL — SIGNIFICANT CHANGE UP (ref 1.8–7.4)
NEUTROPHILS NFR BLD AUTO: 48.1 % — SIGNIFICANT CHANGE UP (ref 43–77)
NRBC # BLD: 0 /100 WBCS — SIGNIFICANT CHANGE UP (ref 0–0)
NRBC # FLD: 0 K/UL — SIGNIFICANT CHANGE UP (ref 0–0)
NT-PROBNP SERPL-SCNC: 94 PG/ML — SIGNIFICANT CHANGE UP
PLATELET # BLD AUTO: 215 K/UL — SIGNIFICANT CHANGE UP (ref 150–400)
POTASSIUM SERPL-MCNC: 4 MMOL/L — SIGNIFICANT CHANGE UP (ref 3.5–5.3)
POTASSIUM SERPL-SCNC: 4 MMOL/L — SIGNIFICANT CHANGE UP (ref 3.5–5.3)
PROT SERPL-MCNC: 6.6 G/DL — SIGNIFICANT CHANGE UP (ref 6–8.3)
RBC # BLD: 4.95 M/UL — SIGNIFICANT CHANGE UP (ref 3.8–5.2)
RBC # FLD: 13.5 % — SIGNIFICANT CHANGE UP (ref 10.3–14.5)
SODIUM SERPL-SCNC: 140 MMOL/L — SIGNIFICANT CHANGE UP (ref 135–145)
TROPONIN T, HIGH SENSITIVITY RESULT: 7 NG/L — SIGNIFICANT CHANGE UP
TROPONIN T, HIGH SENSITIVITY RESULT: 7 NG/L — SIGNIFICANT CHANGE UP
WBC # BLD: 5.59 K/UL — SIGNIFICANT CHANGE UP (ref 3.8–10.5)
WBC # FLD AUTO: 5.59 K/UL — SIGNIFICANT CHANGE UP (ref 3.8–10.5)

## 2024-10-06 PROCEDURE — 71046 X-RAY EXAM CHEST 2 VIEWS: CPT | Mod: 26

## 2024-10-06 PROCEDURE — 99285 EMERGENCY DEPT VISIT HI MDM: CPT

## 2024-10-06 PROCEDURE — 93010 ELECTROCARDIOGRAM REPORT: CPT

## 2024-10-06 RX ORDER — HYDROMORPHONE HYDROCHLORIDE 1 MG/ML
1 INJECTION, SOLUTION INTRAMUSCULAR; INTRAVENOUS; SUBCUTANEOUS ONCE
Refills: 0 | Status: DISCONTINUED | OUTPATIENT
Start: 2024-10-06 | End: 2024-10-06

## 2024-10-06 RX ORDER — KETOROLAC TROMETHAMINE 10 MG/1
15 TABLET, FILM COATED ORAL ONCE
Refills: 0 | Status: DISCONTINUED | OUTPATIENT
Start: 2024-10-06 | End: 2024-10-06

## 2024-10-06 RX ORDER — ACETAMINOPHEN 325 MG
1000 TABLET ORAL ONCE
Refills: 0 | Status: COMPLETED | OUTPATIENT
Start: 2024-10-06 | End: 2024-10-06

## 2024-10-06 RX ORDER — SODIUM CHLORIDE 0.9 % (FLUSH) 0.9 %
1000 SYRINGE (ML) INJECTION ONCE
Refills: 0 | Status: COMPLETED | OUTPATIENT
Start: 2024-10-06 | End: 2024-10-06

## 2024-10-06 RX ADMIN — Medication 1000 MILLILITER(S): at 16:46

## 2024-10-06 RX ADMIN — KETOROLAC TROMETHAMINE 15 MILLIGRAM(S): 10 TABLET, FILM COATED ORAL at 16:45

## 2024-10-06 RX ADMIN — Medication 400 MILLIGRAM(S): at 16:45

## 2024-10-06 NOTE — ED ADULT NURSE NOTE - NSFALLRISKASMTTYPE_ED_ALL_ED
Ángel Mccloud called to say that he had delt with 3 pharmacies trying to get his meds for his IVIG on Friday. He finally got one but [facility] needs the address for the gamma guard they can have it delivered. He had sent in a numberFire message but wanted to make sure that the message was attended to.  Please advise as necessary
Initial (On Arrival)

## 2024-10-06 NOTE — ED PROVIDER NOTE - CLINICAL SUMMARY MEDICAL DECISION MAKING FREE TEXT BOX
[place], DO (PGY2): 69-year-old female, history of hypertension, diabetes on insulin and Ozempic, presenting to the ED today for chest pain radiating to the left shoulder and left upper arm.  Patient also with nausea and vomiting.  No fever, chills, diaphoresis, cough or congestion.  Patient also reporting headache that started this morning and elevated blood pressure.  Reports that last time she had a headache like this, she was found to be hyperglycemic. She did take the losartan 100 this morning.  No neurological symptoms.      GENERAL: no acute distress, non-toxic appearing  HEAD: normocephalic, atraumatic  HEENT: oral mucosa moist, full ROM of neck  CARDIAC: regular rate rhythm, normal S1/S2  CHEST: CTA BL, no wheeze or crackles. No chest wall tenderness to palpation  ABDOMEN: normal BS, soft, no tenderness  EXTREMITY: no gross deformity, no edema, good perfusion.   NEURO: alert and orientedx3. No focal neurologic deficits. Normal sensation. 5/5 strength in all extremities.    EKG NSR with no TD or STD. Differential diagnosis includes but is not limited to ACS, renal pathology, pancreatitis given Ozempic plan to obtain CBC, CMP, troponin.  proBNP. Will also obtain chest xray. Dispo and further management pending results and reassessment. [place], DO (PGY2): 69-year-old female, history of hypertension, diabetes on insulin and Ozempic, presenting to the ED today for chest pain radiating to the left shoulder and left upper arm.  Patient also with nausea and vomiting.  No fever, chills, diaphoresis, cough or congestion.  Patient also reporting headache that started this morning and elevated blood pressure.  Reports that last time she had a headache like this, she was found to be hyperglycemic. She did take the losartan 100 this morning.  No neurological symptoms.      GENERAL: no acute distress, non-toxic appearing  HEAD: normocephalic, atraumatic  HEENT: oral mucosa moist, full ROM of neck  CARDIAC: regular rate rhythm, normal S1/S2  CHEST: CTA BL, no wheeze or crackles. No chest wall tenderness to palpation  ABDOMEN: normal BS, soft, no tenderness  EXTREMITY: no gross deformity, no edema, good perfusion.   NEURO: alert and orientedx3. No focal neurologic deficits. Normal sensation. 5/5 strength in all extremities.    EKG NSR with no TD or STD. Differential diagnosis includes but is not limited to ACS, renal pathology, pancreatitis given Ozempic. Low concerns for intracranial pathology given nonfocal neur exam. Plan to obtain CBC, CMP, troponin.  proBNP. Will also obtain chest xray. Dispo and further management pending results and reassessment.

## 2024-10-06 NOTE — ED PROVIDER NOTE - ATTENDING CONTRIBUTION TO CARE
Dr. Vieira, Attending Physician-  I performed a face to face bedside interview with patient regarding history of present illness, review of symptoms and past medical history. I completed an independent physical exam.  I have discussed patient's plan of care with the resident.    69-year-old female with history of HTN, DM on insulin and Ozempic, presenting to ED for headache associated with chest pain radiating to left shoulder in the setting of elevated blood pressure.  Patient also reports nausea, describes chest pain as burning sensation in chest.  Patient states headache is similar in nature to previous headaches however she does not get them frequently, severe, has not trialed any medication for pain relief.  Took her blood pressure medication this morning after noting it to be >200s/100s.   No history of adverse cardiac events, no previous cardiac workup.  Of note, patient reports increase in Ozempic dose last week.  Denies fever/chills, head trauma, SOB, abdominal pain, vomiting, diarrhea/constipation, numbness/tingling, focal weakness, ROS otherwise negative.    VS unremarkable  General: WN/WD NAD  Head: Atraumatic  Eyes: EOM grossly in tact, no scleral icterus  ENT: dry mucous membranes  Neurology: A&Ox3, nonfocal, GAMBLE x 4  Respiratory: normal respiratory effort, ctab b/l no w/r/r  CV: Extremities warm and well perfused, rrr no m/r/g  Abdominal: Soft, non-distended, non-tender  Extremities: No edema  Skin: No rashes    Differential diagnosis includes but is not limited to ACS, renal pathology, pancreatitis, electrolyte abnormality given Ozempic. Low concerns for intracranial pathology given nonfocal neuro exam and HA similar in nature to prev, no head trauma, pt not on AC. Plan to obtain CBC, CMP, troponin. proBNP. Will also obtain chest xray. Dispo and further management pending results and reassessment. - Dante Vieira MD. EM Attending

## 2024-10-06 NOTE — ED PROVIDER NOTE - NSCAREINITIATED _GEN_ER
Pt calling to request an order for a chest x-ray because she is still coughing all the time.  Pt is planning to come at 10:00 for her labs and would like to have this done as well.  Pt does not need a call back.    [place], Leydricah(Resident)

## 2024-10-06 NOTE — ED ADULT NURSE NOTE - IN ACCORDANCE WITH [place] STATE LAW, WE OFFER EVERY PATIENT A HEPATITIS C TEST. WOULD YOU LIKE TO BE TESTED TODAY?
Opt out Anesthesia Type: 1% lidocaine with 1:200,000 epinephrine and a 1:10 solution of 8.4% sodium bicarbonate

## 2024-10-06 NOTE — ED ADULT NURSE NOTE - NSFALLUNIVINTERV_ED_ALL_ED
Bed/Stretcher in lowest position, wheels locked, appropriate side rails in place/Call bell, personal items and telephone in reach/Instruct patient to call for assistance before getting out of bed/chair/stretcher/Non-slip footwear applied when patient is off stretcher/[place] to call system/Physically safe environment - no spills, clutter or unnecessary equipment/Purposeful proactive rounding/Room/bathroom lighting operational, light cord in reach

## 2024-10-06 NOTE — ED ADULT TRIAGE NOTE - CHIEF COMPLAINT QUOTE
Pt arrives ambulatory to triage c/o headache x2 days and midsternal chest pain radiating to left shoulder that began this AM. Began while at rest. Sent from urgent care for further eval. RR even/unlabored. SBP >200 at urgent care. phx: DM2, HTN.

## 2024-10-06 NOTE — ED PROVIDER NOTE - NSFOLLOWUPINSTRUCTIONS_ED_ALL_ED_FT
You were seen and evaluated in the Emergency Department for your headache.     Clinical examination and history demonstrated no acute evidence of any life-threatening risks to your health as a result of your headache. You received medication in the Emergency Department for your headache.     Should you develop nausea, vomiting, worsening headaches, inability to walk properly, numbness or tingling in the extremities, dizziness, or changes to your hearing/vision - please immediately return to the Emergency Department for evaluation of your symptoms.     At this time your clinical evaluation and history do not demonstrate any acute, life-threatening medical conditions warranting emergent treatment. However, we strongly recommend you follow up with one of our Cardiology consultants (or your own) for further evaluation of your symptoms by calling the following number to make an appointment:    [facility]  Cardiology  [address] [place] [address]  Phone: [phone]    Our discharge center will call you and assist you with making the appt or you can call: Find a Physician helpline ([phone]) for assistance.    Should you develop new or worsening chest pain, shortness of breath, fevers, chills, nausea, vomiting, diarrhea, or constipation - please return to the ED for immediate evaluation.     We also strongly encourage you make an appointment with your Primary Care Physician for a comprehensive evaluation of your health.

## 2024-10-06 NOTE — ED PROVIDER NOTE - CARE PLAN
1 Principal Discharge DX:	Chest pain   Principal Discharge DX:	Headache  Secondary Diagnosis:	Chest pain

## 2024-10-06 NOTE — ED PROVIDER NOTE - PATIENT PORTAL LINK FT
You can access the FollowMyHealth Patient Portal offered by [facility] by registering at the following website: [email or web address]. By joining [facility]’s FollowMyHealth portal, you will also be able to view your health information using other applications (apps) compatible with our system.

## 2024-10-06 NOTE — ED ADULT NURSE NOTE - OBJECTIVE STATEMENT
Break RN: Pt is a 70 y/o Female, A&Ox4, ambulatory with a PHx of HTN and DM. Pt presents to the ED from urgent care with c/o headache and intermittent mid to left sided chest pain x 2 days. Pt states her SBP was in the 200s at urgent care and was advised to come to the ED for further eval. Pt states she is compliant with medications. Pt also endorses left arm pain. Neuro/sensory intact. Respirations even and unlabored, chest rise equal b/l. NSR noted on cardiac monitor. Pt denies SOB, fever, cough, chills, abdominal pain, N/V/D, dizziness, numbness/tingling or any urinary symptoms at this time. 20g IVL placed in RAC. Labs collected and sent. No acute distress noted. Safety maintained throughout.

## 2024-10-16 DIAGNOSIS — R51.9 HEADACHE, UNSPECIFIED: ICD-10-CM

## 2024-10-16 DIAGNOSIS — R07.9 CHEST PAIN, UNSPECIFIED: ICD-10-CM

## 2024-10-16 RX ORDER — SEMAGLUTIDE 1.34 MG/ML
2 INJECTION, SOLUTION SUBCUTANEOUS WEEKLY
Refills: 0 | Status: ACTIVE | COMMUNITY

## 2024-10-16 RX ORDER — LOSARTAN POTASSIUM 100 MG/1
100 TABLET, FILM COATED ORAL DAILY
Refills: 0 | Status: ACTIVE | COMMUNITY

## 2024-10-16 RX ORDER — METHOCARBAMOL 750 MG/1
750 TABLET, FILM COATED ORAL EVERY 8 HOURS
Refills: 0 | Status: ACTIVE | COMMUNITY

## 2024-10-16 RX ORDER — ONDANSETRON 4 MG/1
4 TABLET, ORALLY DISINTEGRATING ORAL
Refills: 0 | Status: ACTIVE | COMMUNITY

## 2024-10-16 RX ORDER — INSULIN ASPART 100 [IU]/ML
100 INJECTION, SOLUTION INTRAVENOUS; SUBCUTANEOUS
Refills: 0 | Status: ACTIVE | COMMUNITY

## 2024-10-16 RX ORDER — TRAMADOL HYDROCHLORIDE 50 MG/1
50 TABLET, COATED ORAL EVERY 8 HOURS
Refills: 0 | Status: ACTIVE | COMMUNITY

## 2024-11-18 ENCOUNTER — APPOINTMENT (OUTPATIENT)
Dept: CARDIOLOGY | Facility: CLINIC | Age: 69
End: 2024-11-18

## 2025-03-19 NOTE — ED ADULT NURSE NOTE - SUICIDE SCREENING QUESTION 3
Post Op Call Outreach  Called and LVM for patient- advised that we were calling to check up on her following surgery.  Advised patient to call office back with any questions or concerns.   
No